# Patient Record
Sex: FEMALE | Employment: UNEMPLOYED | URBAN - METROPOLITAN AREA
[De-identification: names, ages, dates, MRNs, and addresses within clinical notes are randomized per-mention and may not be internally consistent; named-entity substitution may affect disease eponyms.]

---

## 2022-07-06 ENCOUNTER — OFFICE VISIT (OUTPATIENT)
Dept: FAMILY MEDICINE CLINIC | Facility: CLINIC | Age: 42
End: 2022-07-06

## 2022-07-06 VITALS
OXYGEN SATURATION: 98 % | SYSTOLIC BLOOD PRESSURE: 110 MMHG | WEIGHT: 225.4 LBS | HEART RATE: 86 BPM | BODY MASS INDEX: 39.94 KG/M2 | DIASTOLIC BLOOD PRESSURE: 78 MMHG | HEIGHT: 63 IN | TEMPERATURE: 98.5 F

## 2022-07-06 DIAGNOSIS — F32.2 SEVERE DEPRESSION (HCC): Primary | ICD-10-CM

## 2022-07-06 DIAGNOSIS — Z13.6 SCREENING FOR HEART DISEASE: ICD-10-CM

## 2022-07-06 DIAGNOSIS — F41.0 PANIC DISORDER: ICD-10-CM

## 2022-07-06 DIAGNOSIS — Z12.31 ENCOUNTER FOR SCREENING MAMMOGRAM FOR MALIGNANT NEOPLASM OF BREAST: ICD-10-CM

## 2022-07-06 DIAGNOSIS — F33.2 SEVERE EPISODE OF RECURRENT MAJOR DEPRESSIVE DISORDER, WITHOUT PSYCHOTIC FEATURES (HCC): ICD-10-CM

## 2022-07-06 DIAGNOSIS — F42.2 MIXED OBSESSIONAL THOUGHTS AND ACTS: ICD-10-CM

## 2022-07-06 PROCEDURE — 99214 OFFICE O/P EST MOD 30 MIN: CPT

## 2022-07-06 RX ORDER — FLUOXETINE HYDROCHLORIDE 20 MG/1
20 CAPSULE ORAL DAILY
Qty: 30 CAPSULE | Refills: 3 | Status: SHIPPED | OUTPATIENT
Start: 2022-07-06 | End: 2022-07-07 | Stop reason: SDUPTHER

## 2022-07-06 NOTE — ASSESSMENT & PLAN NOTE
Start Prozac  It can take 4-6 weeks for this medication to be fully therapeutic  Call with any affects

## 2022-07-06 NOTE — PROGRESS NOTES
Assessment/Plan:         Problem List Items Addressed This Visit        Other    Panic disorder     Start taking melatonin at night to get better sleep  Start Prozac once daily in the am  It can take 4-6 weeks for this medication to be fully therapeutic  Call with any affects  Relevant Medications    FLUoxetine (PROzac) 20 mg capsule    Other Relevant Orders    TSH, 3rd generation with Free T4 reflex    Mixed obsessional thoughts and acts     Start Prozac, please see a therapist  It can take 4-6 weeks for this medication to be fully therapeutic  Call with any affects  Severe depression ([facility] [address] ) - Primary     Start Prozac  It can take 4-6 weeks for this medication to be fully therapeutic  Call with any affects  Relevant Medications    FLUoxetine (PROzac) 20 mg capsule    Other Relevant Orders    TSH, 3rd generation with Free T4 reflex    Ambulatory Referral to Behavioral Health      Other Visit Diagnoses     Encounter for screening mammogram for malignant neoplasm of breast        Have mammogram as soon as possible  Relevant Orders    Mammo screening bilateral w 3d & cad    Screening for heart disease        Have lab work, will call with results  Relevant Orders    CBC and differential    Lipid panel    TSH, 3rd generation with Free T4 reflex    Comprehensive metabolic panel    Severe episode of recurrent major depressive disorder, without psychotic features (HCC)        Relevant Medications    FLUoxetine (PROzac) 20 mg capsule    BMI 39 0-39 9,adult                Subjective:      Patient ID: Kristi Alan is a 37 y o  female  Vee Ferrari is here today to establish  She has not had healthcare for 2 years because of not having insurance  She saw [facility] in the past  Her sister was diagnosed with breast cancer at 36, Vee Ferrari had a mammo at 46 and it was normal  She has been suffering with severe depression since her daughter was born 7 years ago         The following portions of the patient's history were reviewed and updated as appropriate:   Past Medical History:  She has a past medical history of Mild persistent asthma without complication  ,  _______________________________________________________________________  Medical Problems:  does not have any pertinent problems on file ,  _______________________________________________________________________  Past Surgical History:   has a past surgical history that includes Hysterectomy (Bilateral, 04/2018)  ,  _______________________________________________________________________  Family History:  family history includes Breast cancer in her maternal aunt and sister; Hypertension in her mother ,  _______________________________________________________________________  Social History:   reports that she has never smoked  She has never used smokeless tobacco  She reports previous alcohol use  No history on file for drug use ,  _______________________________________________________________________  Allergies:  has No Known Allergies     _______________________________________________________________________  Current Outpatient Medications   Medication Sig Dispense Refill    FLUoxetine (PROzac) 20 mg capsule Take 1 capsule (20 mg total) by mouth daily 30 capsule 3     No current facility-administered medications for this visit      _______________________________________________________________________  Review of Systems   Constitutional: Positive for fatigue  Negative for chills, diaphoresis and fever  HENT: Negative for congestion, ear pain, sinus pressure, sinus pain and sore throat  Eyes: Negative for pain and visual disturbance  Respiratory: Negative for cough, shortness of breath and wheezing  Cardiovascular: Negative for chest pain and palpitations  Gastrointestinal: Negative for abdominal pain, diarrhea, nausea and vomiting  Genitourinary: Negative for dysuria, frequency, hematuria and urgency     Musculoskeletal: Negative for arthralgias, back pain and myalgias  Skin: Negative for color change and rash  Neurological: Negative for dizziness, seizures, syncope, light-headedness and headaches  Psychiatric/Behavioral: Positive for agitation (Has panic attacks at night), dysphoric mood and sleep disturbance (sleeps 4-5 hours a night)  Negative for suicidal ideas  All other systems reviewed and are negative  Objective:  Vitals:    07/06/22 1603   BP: 110/78   BP Location: Left arm   Patient Position: Sitting   Cuff Size: Standard   Pulse: 86   Temp: 98 5 °F (36 9 °C)   TempSrc: Tympanic   SpO2: 98%   Weight: 102 kg (225 lb 6 4 oz)   Height: 5' 3" (1 6 m)     Body mass index is 39 93 kg/m²  Physical Exam  Vitals and nursing note reviewed  Constitutional:       Appearance: Normal appearance  She is not ill-appearing  HENT:      Head: Normocephalic  Right Ear: Tympanic membrane, ear canal and external ear normal  There is no impacted cerumen  Left Ear: Tympanic membrane, ear canal and external ear normal  There is no impacted cerumen  Nose: Nose normal  No congestion  Mouth/Throat:      Mouth: Mucous membranes are moist       Pharynx: No posterior oropharyngeal erythema  Eyes:      Extraocular Movements: Extraocular movements intact  Cardiovascular:      Rate and Rhythm: Normal rate and regular rhythm  Heart sounds: Normal heart sounds  No murmur heard  Pulmonary:      Effort: Pulmonary effort is normal       Breath sounds: Normal breath sounds  No wheezing  Abdominal:      Palpations: Abdomen is soft  Tenderness: There is no abdominal tenderness  Musculoskeletal:         General: Normal range of motion  Cervical back: Normal range of motion  Right lower leg: No edema  Left lower leg: No edema  Skin:     General: Skin is warm and dry  Neurological:      General: No focal deficit present  Mental Status: She is alert     Psychiatric:         Mood and Affect: Mood normal          Behavior: Behavior normal          Depression Screening Follow-up Plan: Patient's depression screening was positive with a PHQ-2 score of 6  Their PHQ-9 score was 25  Patient assessed for underlying major depression  They have no active suicidal ideations  Brief counseling provided and recommend additional follow-up/re-evaluation next office visit  BMI Counseling: Body mass index is 39 93 kg/m²  The BMI is above normal  Nutrition recommendations include 3-5 servings of fruits/vegetables daily and reducing intake of cholesterol  Exercise recommendations include moderate aerobic physical activity for 150 minutes/week and exercising 3-5 times per week

## 2022-07-06 NOTE — ASSESSMENT & PLAN NOTE
Start taking melatonin at night to get better sleep  Start Prozac once daily in the am  It can take 4-6 weeks for this medication to be fully therapeutic  Call with any affects

## 2022-07-06 NOTE — ASSESSMENT & PLAN NOTE
Start Prozac, please see a therapist  It can take 4-6 weeks for this medication to be fully therapeutic  Call with any affects

## 2022-07-06 NOTE — PATIENT INSTRUCTIONS
Problem List Items Addressed This Visit          Other    Panic disorder     Start taking melatonin at night to get better sleep  Start Prozac once daily in the am  It can take 4-6 weeks for this medication to be fully therapeutic  Call with any affects  Relevant Medications    FLUoxetine (PROzac) 20 mg capsule    Other Relevant Orders    TSH, 3rd generation with Free T4 reflex    Mixed obsessional thoughts and acts     Start Prozac, please see a therapist  It can take 4-6 weeks for this medication to be fully therapeutic  Call with any affects  Severe depression ([facility] [address] ) - Primary     Start Prozac  It can take 4-6 weeks for this medication to be fully therapeutic  Call with any affects  Relevant Medications    FLUoxetine (PROzac) 20 mg capsule    Other Relevant Orders    TSH, 3rd generation with Free T4 reflex    Ambulatory Referral to Behavioral Health          Other Visit Diagnoses       Encounter for screening mammogram for malignant neoplasm of breast        Have mammogram as soon as possible  Relevant Orders    Mammo screening bilateral w 3d & cad    Screening for heart disease        Have lab work, will call with results  Relevant Orders    CBC and differential    Lipid panel    TSH, 3rd generation with Free T4 reflex    Comprehensive metabolic panel            Depression   AMBULATORY CARE:   Depression  is a medical condition that causes feelings of sadness or hopelessness that do not go away  Depression may cause you to lose interest in things you used to enjoy  These feelings may interfere with your daily life    Common symptoms include the following:   · Appetite changes, or weight gain or loss    · Trouble going to sleep or staying asleep, or sleeping too much    · Fatigue or lack of energy    · Feeling restless, irritable, or withdrawn    · Feeling worthless, hopeless, discouraged, or guilty    · Trouble concentrating, remembering things, doing daily tasks, or making decisions    · Thoughts about hurting or killing yourself    Call your local emergency number (911 in the [address]) if:   · You think about harming yourself or someone else  · You have done something on purpose to hurt yourself  Call your therapist or doctor if:   · Your symptoms do not improve  · You cannot make it to your next appointment  · You have new symptoms  · You have questions or concerns about your condition or care  The following resources are available at any time to help you, if needed:   · [address] [place] [address]: 8-426.890.1968 (1-829-899-UMEA)     · Suicide Hotline: 4-703.732.1149 (4-462-NYZCCKF)     · For a list of international numbers: https://save org/find-help/international-resources/    Treatment for depression  may include medicine to relieve depression  Medicine is often used together with therapy  Therapy is a way for you to talk about your feelings and anything that may be causing depression  Therapy can be done alone or in a group  It may also be done with family members or a significant other  Self-care:   · Get regular physical activity  Try to be active for 30 minutes, 3 to 5 days a week  Physical activity can help relieve depression  Work with your healthcare provider to develop a plan that you enjoy  It may help to ask someone to be active with you  · Create a regular sleep schedule  A routine can help you relax before bed  Listen to music, read, or do yoga  Try to go to bed and wake up at the same time every day  Sleep is important for emotional health  · Eat a variety of healthy foods  Healthy foods include fruits, vegetables, whole-grain breads, low-fat dairy products, lean meats, fish, and cooked beans  A healthy meal plan is low in fat, salt, and added sugar  · Do not drink alcohol or use drugs  Alcohol and drugs can make depression worse  Talk to your therapist or doctor if you need help quitting      Follow up with your healthcare provider as directed: Your healthcare provider will monitor your progress at follow-up visits  He or she will also monitor your medicine if you take antidepressants  Your healthcare provider will ask if the medicine is helping  Tell him or her about any side effects or problems you may have with your medicine  The type or amount of medicine may need to be changed  Write down your questions so you remember to ask them during your visits  © Copyright [employer] 2022 Information is for End User's use only and may not be sold, redistributed or otherwise used for commercial purposes  All illustrations and images included in CareNotes® are the copyrighted property of [employer] A M [employer] Inc  or [facility] [employer]   The above information is an  only  It is not intended as medical advice for individual conditions or treatments  Talk to your doctor, nurse or pharmacist before following any medical regimen to see if it is safe and effective for you  Heart Healthy Diet   AMBULATORY CARE:   A heart healthy diet  is an eating plan low in unhealthy fats and sodium (salt)  The plan is high in healthy fats and fiber  A heart healthy diet helps improve your cholesterol levels and lowers your risk for heart disease and stroke  A dietitian will teach you how to read and understand food labels  Heart healthy diet guidelines to follow:   · Choose foods that contain healthy fats  ? Unsaturated fats  include monounsaturated and polyunsaturated fats  Unsaturated fat is found in foods such as soybean, canola, olive, corn, and safflower oils  It is also found in soft tub margarine that is made with liquid vegetable oil  ? Omega-3 fat  is found in certain fish, such as salmon, tuna, and trout, and in walnuts and flaxseed  Eat fish high in omega-3 fats at least 2 times a week  · Get 20 to 30 grams of fiber each day  Fruits, vegetables, whole-grain foods, and legumes (cooked beans) are good sources of fiber  · Limit or do not have unhealthy fats  ? Cholesterol  is found in animal foods, such as eggs and lobster, and in dairy products made from whole milk  Limit cholesterol to less than 200 mg each day  ? Saturated fat  is found in meats, such as lopez and hamburger  It is also found in chicken or turkey skin, whole milk, and butter  Limit saturated fat to less than 7% of your total daily calories  ? Trans fat  is found in packaged foods, such as potato chips and cookies  It is also in hard margarine, some fried foods, and shortening  Do not eat foods that contain trans fats  · Limit sodium as directed  You may be told to limit sodium to 2,000 to 2,300 mg each day  Choose low-sodium or no-salt-added foods  Add little or no salt to food you prepare  Use herbs and spices in place of salt  Include the following in your heart healthy plan:  Ask your dietitian or healthcare provider how many servings to have from each of the following food groups:  · Grains:      ? Whole-wheat breads, cereals, and pastas, and brown rice    ? Low-fat, low-sodium crackers and chips    · Vegetables:      ? Broccoli, green beans, green peas, and spinach    ? Collards, kale, and lima beans    ? Carrots, sweet potatoes, tomatoes, and peppers    ? Canned vegetables with no salt added    · Fruits:      ? Bananas, peaches, pears, and pineapple    ? Grapes, raisins, and dates    ? Oranges, tangerines, grapefruit, orange juice, and grapefruit juice    ? Apricots, mangoes, melons, and papaya    ? Raspberries and strawberries    ? Canned fruit with no added sugar    · Low-fat dairy:      ? Nonfat (skim) milk, 1% milk, and low-fat almond, cashew, or soy milks fortified with calcium    ? Low-fat cheese, regular or frozen yogurt, and cottage cheese    · Meats and proteins:      ? Lean cuts of beef and pork (loin, leg, round), skinless chicken and turkey    ?  Legumes, soy products, egg whites, or nuts    Limit or do not include the following in your heart healthy plan:   · Unhealthy fats and oils:      ? Whole or 2% milk, cream cheese, sour cream, or cheese    ? High-fat cuts of beef (T-bone steaks, ribs), chicken or turkey with skin, and organ meats such as liver    ? Butter, stick margarine, shortening, and cooking oils such as coconut or palm oil    · Foods and liquids high in sodium:      ? Packaged foods, such as frozen dinners, cookies, macaroni and cheese, and cereals with more than 300 mg of sodium per serving    ? Vegetables with added sodium, such as instant potatoes, vegetables with added sauces, or regular canned vegetables    ? Cured or smoked meats, such as hot dogs, lopez, and sausage    ? High-sodium ketchup, barbecue sauce, salad dressing, pickles, olives, soy sauce, or miso    · Foods and liquids high in sugar:      ? Candy, cake, cookies, pies, or doughnuts    ? Soft drinks (soda), sports drinks, or sweetened tea    ? Canned or dry mixes for cakes, soups, sauces, or gravies    Other healthy heart guidelines:   · Do not smoke  Nicotine and other chemicals in cigarettes and cigars can cause lung and heart damage  Ask your healthcare provider for information if you currently smoke and need help to quit  E-cigarettes or smokeless tobacco still contain nicotine  Talk to your healthcare provider before you use these products  · Limit or do not drink alcohol as directed  Alcohol can damage your heart and raise your blood pressure  Your healthcare provider may give you specific daily and weekly limits  The general recommended limit is 1 drink a day for women 21 or older and for men 72 or older  Do not have more than 3 drinks in a day or 7 in a week  The recommended limit is 2 drinks a day for men 24to 59years of age  Do not have more than 4 drinks in a day or 14 in a week  A drink of alcohol is 12 ounces of beer, 5 ounces of wine, or 1½ ounces of liquor  · Exercise regularly    Exercise can help you maintain a healthy weight and improve your blood pressure and cholesterol levels  Regular exercise can also decrease your risk for heart problems  Ask your healthcare provider about the best exercise plan for you  Do not start an exercise program without asking your healthcare provider  Follow up with your doctor or cardiologist as directed:  Write down your questions so you remember to ask them during your visits  © Copyright [address] 2022 Information is for End User's use only and may not be sold, redistributed or otherwise used for commercial purposes  All illustrations and images included in CareNotes® are the copyrighted property of [employer]  or [facility] [employer]   The above information is an  only  It is not intended as medical advice for individual conditions or treatments  Talk to your doctor, nurse or pharmacist before following any medical regimen to see if it is safe and effective for you

## 2022-07-07 ENCOUNTER — TELEPHONE (OUTPATIENT)
Dept: FAMILY MEDICINE CLINIC | Facility: CLINIC | Age: 42
End: 2022-07-07

## 2022-07-07 DIAGNOSIS — F32.2 SEVERE DEPRESSION (HCC): ICD-10-CM

## 2022-07-07 RX ORDER — FLUOXETINE HYDROCHLORIDE 20 MG/1
20 CAPSULE ORAL DAILY
Qty: 30 CAPSULE | Refills: 3 | Status: SHIPPED | OUTPATIENT
Start: 2022-07-07 | End: 2022-11-10 | Stop reason: SDUPTHER

## 2022-07-07 NOTE — TELEPHONE ENCOUNTER
Sarbjit Pill called in stated [facility] never received script for prozac  Called [facility] to confirm and they do not have the script   Can you please cancel script and send new script in

## 2022-11-10 ENCOUNTER — TELEPHONE (OUTPATIENT)
Dept: FAMILY MEDICINE CLINIC | Facility: CLINIC | Age: 42
End: 2022-11-10

## 2022-11-10 DIAGNOSIS — F32.2 SEVERE DEPRESSION (HCC): ICD-10-CM

## 2022-11-10 RX ORDER — FLUOXETINE HYDROCHLORIDE 20 MG/1
20 CAPSULE ORAL DAILY
Qty: 30 CAPSULE | Refills: 3 | Status: SHIPPED | OUTPATIENT
Start: 2022-11-10 | End: 2023-04-18 | Stop reason: SDUPTHER

## 2022-11-10 NOTE — TELEPHONE ENCOUNTER
Pt called and left message on [employer]  Requested refill on her fluoxetine (prozac) 20 mg  Said she has no more refills left, needed more put in by you as pharmacy told her to talk with her provider for more refills

## 2023-09-06 DIAGNOSIS — F32.2 SEVERE DEPRESSION (HCC): ICD-10-CM

## 2023-09-06 RX ORDER — FLUOXETINE HYDROCHLORIDE 20 MG/1
20 CAPSULE ORAL DAILY
Qty: 30 CAPSULE | Refills: 0 | Status: SHIPPED | OUTPATIENT
Start: 2023-09-06

## 2023-10-30 DIAGNOSIS — F32.2 SEVERE DEPRESSION (HCC): ICD-10-CM

## 2023-10-30 RX ORDER — FLUOXETINE HYDROCHLORIDE 20 MG/1
20 CAPSULE ORAL DAILY
Qty: 30 CAPSULE | Refills: 0 | Status: SHIPPED | OUTPATIENT
Start: 2023-10-30

## 2023-10-30 NOTE — TELEPHONE ENCOUNTER
Patient made aware of refill - also made aware to schedule a follow up once she receives her insurance

## 2023-10-30 NOTE — TELEPHONE ENCOUNTER
Patient stated that she was told that she cannot get a refill until she comes in and gets evaluated by the physician because she has not been to the office in quite sometime. She stated that she is applying for state coverage but it has not come back as of yet.  Please call patient back and let her  know if the physician will refill her medication without a visit, last visit was 07/22

## 2023-11-25 DIAGNOSIS — F32.2 SEVERE DEPRESSION (HCC): ICD-10-CM

## 2023-11-26 ENCOUNTER — APPOINTMENT (EMERGENCY)
Dept: RADIOLOGY | Facility: HOSPITAL | Age: 43
End: 2023-11-26

## 2023-11-26 ENCOUNTER — HOSPITAL ENCOUNTER (EMERGENCY)
Facility: HOSPITAL | Age: 43
Discharge: HOME | End: 2023-11-26
Attending: EMERGENCY MEDICINE | Admitting: EMERGENCY MEDICINE

## 2023-11-26 VITALS
OXYGEN SATURATION: 96 % | DIASTOLIC BLOOD PRESSURE: 56 MMHG | HEART RATE: 81 BPM | SYSTOLIC BLOOD PRESSURE: 98 MMHG | RESPIRATION RATE: 20 BRPM | TEMPERATURE: 97.8 F

## 2023-11-26 DIAGNOSIS — J45.901 ACUTE ASTHMA EXACERBATION: Primary | ICD-10-CM

## 2023-11-26 LAB
ALBUMIN SERPL BCP-MCNC: 4.3 G/DL (ref 3.5–5)
ALP SERPL-CCNC: 67 U/L (ref 34–104)
ALT SERPL W P-5'-P-CCNC: 14 U/L (ref 7–52)
ANION GAP SERPL CALCULATED.3IONS-SCNC: 5 MMOL/L
AST SERPL W P-5'-P-CCNC: 13 U/L (ref 13–39)
ATRIAL RATE: 84 BPM
BASOPHILS # BLD AUTO: 0.04 THOUSANDS/ÂΜL (ref 0–0.1)
BASOPHILS NFR BLD AUTO: 1 % (ref 0–1)
BILIRUB SERPL-MCNC: 0.46 MG/DL (ref 0.2–1)
BUN SERPL-MCNC: 8 MG/DL (ref 5–25)
CALCIUM SERPL-MCNC: 9.1 MG/DL (ref 8.4–10.2)
CARDIAC TROPONIN I PNL SERPL HS: <2 NG/L
CHLORIDE SERPL-SCNC: 105 MMOL/L (ref 96–108)
CO2 SERPL-SCNC: 25 MMOL/L (ref 21–32)
CREAT SERPL-MCNC: 0.79 MG/DL (ref 0.6–1.3)
D DIMER PPP FEU-MCNC: 0.32 UG/ML FEU
EOSINOPHIL # BLD AUTO: 0.18 THOUSAND/ÂΜL (ref 0–0.61)
EOSINOPHIL NFR BLD AUTO: 3 % (ref 0–6)
ERYTHROCYTE [DISTWIDTH] IN BLOOD BY AUTOMATED COUNT: 13 % (ref 11.6–15.1)
FLUAV RNA RESP QL NAA+PROBE: NEGATIVE
FLUBV RNA RESP QL NAA+PROBE: NEGATIVE
GFR SERPL CREATININE-BSD FRML MDRD: 92 ML/MIN/1.73SQ M
GLUCOSE SERPL-MCNC: 111 MG/DL (ref 65–140)
HCT VFR BLD AUTO: 41.7 % (ref 34.8–46.1)
HGB BLD-MCNC: 13.7 G/DL (ref 11.5–15.4)
IMM GRANULOCYTES # BLD AUTO: 0.01 THOUSAND/UL (ref 0–0.2)
IMM GRANULOCYTES NFR BLD AUTO: 0 % (ref 0–2)
LYMPHOCYTES # BLD AUTO: 1.67 THOUSANDS/ÂΜL (ref 0.6–4.47)
LYMPHOCYTES NFR BLD AUTO: 30 % (ref 14–44)
MCH RBC QN AUTO: 30 PG (ref 26.8–34.3)
MCHC RBC AUTO-ENTMCNC: 32.9 G/DL (ref 31.4–37.4)
MCV RBC AUTO: 91 FL (ref 82–98)
MONOCYTES # BLD AUTO: 0.37 THOUSAND/ÂΜL (ref 0.17–1.22)
MONOCYTES NFR BLD AUTO: 7 % (ref 4–12)
NEUTROPHILS # BLD AUTO: 3.27 THOUSANDS/ÂΜL (ref 1.85–7.62)
NEUTS SEG NFR BLD AUTO: 59 % (ref 43–75)
NRBC BLD AUTO-RTO: 0 /100 WBCS
P AXIS: 57 DEGREES
PLATELET # BLD AUTO: 328 THOUSANDS/UL (ref 149–390)
PMV BLD AUTO: 8.9 FL (ref 8.9–12.7)
POTASSIUM SERPL-SCNC: 3.9 MMOL/L (ref 3.5–5.3)
PR INTERVAL: 168 MS
PROT SERPL-MCNC: 7.5 G/DL (ref 6.4–8.4)
QRS AXIS: 63 DEGREES
QRSD INTERVAL: 88 MS
QT INTERVAL: 358 MS
QTC INTERVAL: 423 MS
RBC # BLD AUTO: 4.56 MILLION/UL (ref 3.81–5.12)
RSV RNA RESP QL NAA+PROBE: NEGATIVE
SARS-COV-2 RNA RESP QL NAA+PROBE: NEGATIVE
SODIUM SERPL-SCNC: 135 MMOL/L (ref 135–147)
T WAVE AXIS: 60 DEGREES
VENTRICULAR RATE: 84 BPM
WBC # BLD AUTO: 5.54 THOUSAND/UL (ref 4.31–10.16)

## 2023-11-26 PROCEDURE — 93005 ELECTROCARDIOGRAM TRACING: CPT

## 2023-11-26 PROCEDURE — 99284 EMERGENCY DEPT VISIT MOD MDM: CPT

## 2023-11-26 PROCEDURE — 80053 COMPREHEN METABOLIC PANEL: CPT | Performed by: PHYSICIAN ASSISTANT

## 2023-11-26 PROCEDURE — 85025 COMPLETE CBC W/AUTO DIFF WBC: CPT | Performed by: PHYSICIAN ASSISTANT

## 2023-11-26 PROCEDURE — 36415 COLL VENOUS BLD VENIPUNCTURE: CPT | Performed by: PHYSICIAN ASSISTANT

## 2023-11-26 PROCEDURE — 84484 ASSAY OF TROPONIN QUANT: CPT | Performed by: PHYSICIAN ASSISTANT

## 2023-11-26 PROCEDURE — 94640 AIRWAY INHALATION TREATMENT: CPT

## 2023-11-26 PROCEDURE — 71046 X-RAY EXAM CHEST 2 VIEWS: CPT

## 2023-11-26 PROCEDURE — 99285 EMERGENCY DEPT VISIT HI MDM: CPT | Performed by: PHYSICIAN ASSISTANT

## 2023-11-26 PROCEDURE — 85379 FIBRIN DEGRADATION QUANT: CPT | Performed by: PHYSICIAN ASSISTANT

## 2023-11-26 PROCEDURE — 0241U HB NFCT DS VIR RESP RNA 4 TRGT: CPT | Performed by: PHYSICIAN ASSISTANT

## 2023-11-26 RX ORDER — ALBUTEROL SULFATE 2.5 MG/3ML
5 SOLUTION RESPIRATORY (INHALATION) ONCE
Status: COMPLETED | OUTPATIENT
Start: 2023-11-26 | End: 2023-11-26

## 2023-11-26 RX ORDER — PREDNISONE 50 MG/1
50 TABLET ORAL DAILY
Qty: 4 TABLET | Refills: 0 | Status: SHIPPED | OUTPATIENT
Start: 2023-11-27 | End: 2023-12-01

## 2023-11-26 RX ORDER — ALBUTEROL SULFATE 2.5 MG/3ML
2.5 SOLUTION RESPIRATORY (INHALATION) EVERY 6 HOURS PRN
Qty: 75 ML | Refills: 0 | Status: SHIPPED | OUTPATIENT
Start: 2023-11-26 | End: 2023-11-27 | Stop reason: SDUPTHER

## 2023-11-26 RX ADMIN — ALBUTEROL SULFATE 5 MG: 2.5 SOLUTION RESPIRATORY (INHALATION) at 08:58

## 2023-11-26 RX ADMIN — PREDNISONE 50 MG: 20 TABLET ORAL at 10:29

## 2023-11-26 RX ADMIN — IPRATROPIUM BROMIDE 0.5 MG: 0.5 SOLUTION RESPIRATORY (INHALATION) at 08:58

## 2023-11-26 NOTE — ED PROVIDER NOTES
History  Chief Complaint   Patient presents with    Asthma     Patient co "my asthma is acting up." Symptoms started on Friday. Patient reports she ran out of her nebulizer medication. Patient further reports hx of blood clots in her lungs and states "it kind of feels like when I had blood clots in my lungs. I have a weird feeling in my chest and lungs. "      38yo female with a history of asthma presenting for evaluation of shortness of breath x 2 days. She reports substernal chest tightness and dyspnea. She also has discomfort in her upper back. She reports some improvement when using her albuterol inhaler although she is unsure if asthma is the cause of her symptoms. She has been unable to use her nebulizer as she ran out of nebulizer solution. She has a prior history of PE in the postpartum period about 8 years ago and she is worried about this. She is no longer on anticoagulation. No fevers. History provided by:  Patient   used: No        None       Past Medical History:   Diagnosis Date    Mild persistent asthma without complication        Past Surgical History:   Procedure Laterality Date    HYSTERECTOMY Bilateral 04/2018       Family History   Problem Relation Age of Onset    Hypertension Mother     Breast cancer Sister     Breast cancer Maternal Aunt      I have reviewed and agree with the history as documented. E-Cigarette/Vaping     E-Cigarette/Vaping Substances     Social History     Tobacco Use    Smoking status: Never    Smokeless tobacco: Never   Substance Use Topics    Alcohol use: Not Currently       Review of Systems   Constitutional:  Negative for chills and fever. HENT:  Negative for drooling and voice change. Eyes:  Negative for discharge and redness. Respiratory:  Positive for chest tightness and shortness of breath. Negative for stridor. Cardiovascular:  Negative for chest pain and leg swelling. Gastrointestinal:  Negative for nausea and vomiting. Musculoskeletal:  Negative for neck pain and neck stiffness. Skin:  Negative for color change and rash. Neurological:  Negative for seizures and syncope. Psychiatric/Behavioral:  Negative for confusion. The patient is not nervous/anxious. All other systems reviewed and are negative. Physical Exam  Physical Exam  Vitals and nursing note reviewed. Constitutional:       General: She is not in acute distress. Appearance: She is well-developed. She is not diaphoretic. HENT:      Head: Normocephalic and atraumatic. Right Ear: External ear normal.      Left Ear: External ear normal.      Nose: Nose normal.   Eyes:      General: No scleral icterus. Right eye: No discharge. Left eye: No discharge. Conjunctiva/sclera: Conjunctivae normal.   Cardiovascular:      Rate and Rhythm: Normal rate and regular rhythm. Heart sounds: Normal heart sounds. No murmur heard. Pulmonary:      Effort: Pulmonary effort is normal. No respiratory distress. Breath sounds: No stridor. Decreased breath sounds present. No wheezing or rales. Musculoskeletal:         General: No deformity. Normal range of motion. Cervical back: Normal range of motion and neck supple. Lymphadenopathy:      Cervical: No cervical adenopathy. Skin:     General: Skin is warm and dry. Neurological:      Mental Status: She is alert. She is not disoriented. GCS: GCS eye subscore is 4. GCS verbal subscore is 5. GCS motor subscore is 6.    Psychiatric:         Behavior: Behavior normal.         Vital Signs  ED Triage Vitals [11/26/23 0826]   Temperature Pulse Respirations Blood Pressure SpO2   97.8 °F (36.6 °C) 81 18 133/83 99 %      Temp Source Heart Rate Source Patient Position - Orthostatic VS BP Location FiO2 (%)   Temporal Monitor Lying Right arm --      Pain Score       --           Vitals:    11/26/23 0826 11/26/23 0845 11/26/23 1030   BP: 133/83 121/77 98/56   Pulse: 81 89 81   Patient Position - Orthostatic VS: Lying  Lying         Visual Acuity  Visual Acuity      Flowsheet Row Most Recent Value   L Pupil Size (mm) 3   R Pupil Size (mm) 3            ED Medications  Medications   albuterol inhalation solution 5 mg (5 mg Nebulization Given 11/26/23 0858)   ipratropium (ATROVENT) 0.02 % inhalation solution 0.5 mg (0.5 mg Nebulization Given 11/26/23 0858)   predniSONE tablet 50 mg (50 mg Oral Given 11/26/23 1029)       Diagnostic Studies  Results Reviewed       Procedure Component Value Units Date/Time    D-Dimer [[id number]]  (Normal) Collected: 11/26/23 0907    Lab Status: Final result Specimen: Blood from Arm, Right Updated: 11/26/23 0950     D-Dimer, Quant 0.32 ug/ml FEU     FLU/RSV/COVID - if FLU/RSV clinically relevant [[id number]]  (Normal) Collected: 11/26/23 0857    Lab Status: Final result Specimen: Nares from Nose Updated: 11/26/23 0950     SARS-CoV-2 Negative     INFLUENZA A PCR Negative     INFLUENZA B PCR Negative     RSV PCR Negative    Narrative:      FOR PEDIATRIC PATIENTS - copy/paste COVID Guidelines URL to browser: [email or web address]    SARS-CoV-2 assay is a Nucleic Acid Amplification assay intended for the  qualitative detection of nucleic acid from SARS-CoV-2 in nasopharyngeal  swabs. Results are for the presumptive identification of SARS-CoV-2 RNA. Positive results are indicative of infection with SARS-CoV-2, the virus  causing COVID-19, but do not rule out bacterial infection or co-infection  with other viruses. Laboratories within the [facility] and its  territories are required to report all positive results to the appropriate  public health authorities. Negative results do not preclude SARS-CoV-2  infection and should not be used as the sole basis for treatment or other  patient management decisions. Negative results must be combined with  clinical observations, patient history, and epidemiological information.   This test has not been FDA cleared or approved. This test has been authorized by FDA under an Emergency Use Authorization  (EUA). This test is only authorized for the duration of time the  declaration that circumstances exist justifying the authorization of the  emergency use of an in vitro diagnostic tests for detection of SARS-CoV-2  virus and/or diagnosis of COVID-19 infection under section 564(b)(1) of  the Act, 21 U. S.C. 581RVD-5(P)(4), unless the authorization is terminated  or revoked sooner. The test has been validated but independent review by FDA  and CLIA is pending. Test performed using [employer] [facility]: This RT-PCR assay targets N2,  a region unique to SARS-CoV-2. A conserved region in the E-gene was chosen  for pan-Sarbecovirus detection which includes SARS-CoV-2. According to CMS-2020-01-R, this platform meets the definition of high-throughput technology.     HS Troponin 0hr (reflex protocol) [[id number]]  (Normal) Collected: 11/26/23 0907    Lab Status: Final result Specimen: Blood from Arm, Right Updated: 11/26/23 0939     hs TnI 0hr <2 ng/L     Comprehensive metabolic panel [[id number]] Collected: 11/26/23 0907    Lab Status: Final result Specimen: Blood from Arm, Right Updated: 11/26/23 0934     Sodium 135 mmol/L      Potassium 3.9 mmol/L      Chloride 105 mmol/L      CO2 25 mmol/L      ANION GAP 5 mmol/L      BUN 8 mg/dL      Creatinine 0.79 mg/dL      Glucose 111 mg/dL      Calcium 9.1 mg/dL      AST 13 U/L      ALT 14 U/L      Alkaline Phosphatase 67 U/L      Total Protein 7.5 g/dL      Albumin 4.3 g/dL      Total Bilirubin 0.46 mg/dL      eGFR 92 ml/min/1.73sq m     Narrative:      [place] guidelines for Chronic Kidney Disease (CKD):     Stage 1 with normal or high GFR (GFR > 90 mL/min/1.73 square meters)    Stage 2 Mild CKD (GFR = 60-89 mL/min/1.73 square meters)    Stage 3A Moderate CKD (GFR = 45-59 mL/min/1.73 square meters)    Stage 3B Moderate CKD (GFR = 30-44 mL/min/1.73 square meters)    Stage 4 Severe CKD (GFR = 15-29 mL/min/1.73 square meters)    Stage 5 End Stage CKD (GFR <15 mL/min/1.73 square meters)  Note: GFR calculation is accurate only with a steady state creatinine    CBC and differential [[id number]] Collected: 11/26/23 0907    Lab Status: Final result Specimen: Blood from Arm, Right Updated: 11/26/23 0914     WBC 5.54 Thousand/uL      RBC 4.56 Million/uL      Hemoglobin 13.7 g/dL      Hematocrit 41.7 %      MCV 91 fL      MCH 30.0 pg      MCHC 32.9 g/dL      RDW 13.0 %      MPV 8.9 fL      Platelets 178 Thousands/uL      nRBC 0 /100 WBCs      Neutrophils Relative 59 %      Immat GRANS % 0 %      Lymphocytes Relative 30 %      Monocytes Relative 7 %      Eosinophils Relative 3 %      Basophils Relative 1 %      Neutrophils Absolute 3.27 Thousands/µL      Immature Grans Absolute 0.01 Thousand/uL      Lymphocytes Absolute 1.67 Thousands/µL      Monocytes Absolute 0.37 Thousand/µL      Eosinophils Absolute 0.18 Thousand/µL      Basophils Absolute 0.04 Thousands/µL                    XR chest 2 views   ED Interpretation by Geraldine Barajas PA-C (11/26 0935)   No acute abnormality      Final Result by Ray Frias MD (11/26 1118)      No acute consolidation or congestion               Workstation performed: [id number]                    Procedures  ECG 12 Lead Documentation Only    Date/Time: 11/26/2023 3:53 PM    Performed by: Geraldine Barajas PA-C  Authorized by: Geraldine Barajas PA-C    Indications / Diagnosis:  SOB  ECG reviewed by me, the ED Provider: yes    Patient location:  ED  Rate:     ECG rate:  84    ECG rate assessment: normal    Rhythm:     Rhythm: sinus rhythm    Ectopy:     Ectopy: none    QRS:     QRS axis:  Normal  Conduction:     Conduction: normal    ST segments:     ST segments:  Normal  T waves:     T waves: normal             ED Course  ED Course as of 11/27/23 1126   Sun Nov 26, 2023   0951 D-Dimer, Quant: 0.32 SBIRT 20yo+      Flowsheet Row Most Recent Value   Initial Alcohol Screen: US AUDIT-C     1. How often do you have a drink containing alcohol? 0 Filed at: 11/26/2023 0840   2. How many drinks containing alcohol do you have on a typical day you are drinking? 0 Filed at: 11/26/2023 0840   3b. FEMALE Any Age, or MALE 65+: How often do you have 4 or more drinks on one occassion? 0 Filed at: 11/26/2023 0840   Audit-C Score 0 Filed at: 11/26/2023 0840   YANNI: How many times in the past year have you. .. Used an illegal drug or used a prescription medication for non-medical reasons? Never Filed at: 11/26/2023 0840                      Medical Decision Making  37yoF presenting for shortness of breath x 2 days. Also having chest tightness. Hx of asthma. She is also worried about a PE. She is afebrile and hemodynamically stable. Oxygen saturation 99% on room air. Mildly decreased air movement on lung exam without wheezing. Respirations non-labored. Initial ED plan: Check cardiac labs, D-dimer, COVID/flu swab, EKG, and CXR. DuoNeb and reassess. Final assessment: Labs unremarkable including normal white count. D-dimer normal making PE very unlikely. No ischemic changes on EKG and troponin is <2. CXR is clear. Patient feeling significantly improved after neb treatment and air movement improved. She is stable for discharge. She was started on a course prednisone. Refill given for albuterol solution. Advised close PCP follow-up. ED return precautions discussed. Patient expressed understanding and is agreeable to plan. Patient discharged in stable condition. Problems Addressed:  Acute asthma exacerbation: acute illness or injury    Amount and/or Complexity of Data Reviewed  Labs: ordered. Decision-making details documented in ED Course. Radiology: ordered and independent interpretation performed. ECG/medicine tests: ordered and independent interpretation performed.     Risk  Prescription drug management. Disposition  Final diagnoses:   Acute asthma exacerbation     Time reflects when diagnosis was documented in both MDM as applicable and the Disposition within this note       Time User Action Codes Description Comment    11/26/2023 10:03 AM [address], [address] [J45.901] Acute asthma exacerbation           ED Disposition       ED Disposition   Discharge    Condition   Stable    Date/Time   Sun Nov 26, 2023 [address] discharge to home/self care. Follow-up Information       Follow up With Specialties Details Why Contact Info Additional EDWARD Umaña Nurse Practitioner Schedule an appointment as soon as possible for a visit   [address] [address] [place] [id number]  [facility] Emergency Medicine  If symptoms worsen [address] [facility], [place], [place], [address]            Discharge Medication List as of 11/26/2023 10:04 AM        START taking these medications    Details   predniSONE 50 mg tablet Take 1 tablet (50 mg total) by mouth daily for 4 days Do not start before November 27, 2023., Starting Mon 11/27/2023, Until Fri 12/1/2023, Normal      albuterol (2.5 mg/3 mL) 0.083 % nebulizer solution Take 3 mL (2.5 mg total) by nebulization every 6 (six) hours as needed for wheezing or shortness of breath, Starting Sun 11/26/2023, Normal           CONTINUE these medications which have NOT CHANGED    Details   FLUoxetine (PROzac) 20 mg capsule Take 1 capsule (20 mg total) by mouth daily, Starting Mon 10/30/2023, Normal             No discharge procedures on file.     PDMP Review       None            ED Provider  Electronically Signed by             Stone Royal PA-C  11/27/23 2447

## 2023-11-26 NOTE — DISCHARGE INSTRUCTIONS
Take prednisone as prescribed. Use inhaler and nebulizer as needed. Please follow-up with your family doctor. Return to the ER with any worsening symptoms.

## 2023-11-27 DIAGNOSIS — F32.2 SEVERE DEPRESSION (HCC): ICD-10-CM

## 2023-11-27 DIAGNOSIS — J45.901 ACUTE ASTHMA EXACERBATION: ICD-10-CM

## 2023-11-27 RX ORDER — FLUOXETINE HYDROCHLORIDE 20 MG/1
20 CAPSULE ORAL DAILY
Qty: 30 CAPSULE | Refills: 0 | Status: SHIPPED | OUTPATIENT
Start: 2023-11-27

## 2023-11-27 RX ORDER — ALBUTEROL SULFATE 2.5 MG/3ML
2.5 SOLUTION RESPIRATORY (INHALATION) EVERY 6 HOURS PRN
Qty: 75 ML | Refills: 0 | Status: SHIPPED | OUTPATIENT
Start: 2023-11-27

## 2023-11-27 RX ORDER — FLUOXETINE HYDROCHLORIDE 20 MG/1
20 CAPSULE ORAL DAILY
Qty: 30 CAPSULE | Refills: 0 | Status: SHIPPED | OUTPATIENT
Start: 2023-11-27 | End: 2023-11-27 | Stop reason: SDUPTHER

## 2023-12-26 ENCOUNTER — OFFICE VISIT (OUTPATIENT)
Dept: FAMILY MEDICINE CLINIC | Facility: CLINIC | Age: 43
End: 2023-12-26

## 2023-12-26 VITALS
HEIGHT: 63 IN | HEART RATE: 81 BPM | DIASTOLIC BLOOD PRESSURE: 82 MMHG | TEMPERATURE: 98 F | SYSTOLIC BLOOD PRESSURE: 122 MMHG | WEIGHT: 222.8 LBS | BODY MASS INDEX: 39.48 KG/M2 | OXYGEN SATURATION: 99 %

## 2023-12-26 DIAGNOSIS — F32.2 SEVERE DEPRESSION (HCC): ICD-10-CM

## 2023-12-26 DIAGNOSIS — E55.9 VITAMIN D DEFICIENCY: ICD-10-CM

## 2023-12-26 DIAGNOSIS — F41.0 PANIC DISORDER: ICD-10-CM

## 2023-12-26 DIAGNOSIS — Z13.6 SCREENING FOR CARDIOVASCULAR CONDITION: ICD-10-CM

## 2023-12-26 DIAGNOSIS — G44.209 TENSION HEADACHE: ICD-10-CM

## 2023-12-26 DIAGNOSIS — F42.2 MIXED OBSESSIONAL THOUGHTS AND ACTS: ICD-10-CM

## 2023-12-26 DIAGNOSIS — Z12.31 SCREENING MAMMOGRAM, ENCOUNTER FOR: Primary | ICD-10-CM

## 2023-12-26 PROCEDURE — 96372 THER/PROPH/DIAG INJ SC/IM: CPT

## 2023-12-26 PROCEDURE — 99214 OFFICE O/P EST MOD 30 MIN: CPT

## 2023-12-26 RX ORDER — HYDROXYZINE HYDROCHLORIDE 10 MG/1
10 TABLET, FILM COATED ORAL EVERY 6 HOURS PRN
Qty: 90 TABLET | Refills: 0 | Status: SHIPPED | OUTPATIENT
Start: 2023-12-26

## 2023-12-26 RX ORDER — KETOROLAC TROMETHAMINE 30 MG/ML
30 INJECTION, SOLUTION INTRAMUSCULAR; INTRAVENOUS ONCE
Status: COMPLETED | OUTPATIENT
Start: 2023-12-26 | End: 2023-12-26

## 2023-12-26 RX ADMIN — KETOROLAC TROMETHAMINE 30 MG: 30 INJECTION, SOLUTION INTRAMUSCULAR; INTRAVENOUS at 14:37

## 2023-12-26 NOTE — PROGRESS NOTES
Assessment/Plan:         Problem List Items Addressed This Visit        Other    Panic disorder     Will add hydroxyzine as needed for anxiety and sleep, follow up in 2 weeks for physical. For now declines behavioral health. Recommend a support group for moms with children with autism. Continue current dose of prozac.          Relevant Medications    hydrOXYzine HCL (ATARAX) 10 mg tablet    Mixed obsessional thoughts and acts     Will add hydroxyzine as needed for anxiety and sleep, follow up in 2 weeks for physical. For now declines behavioral health. Recommend a support group for moms with children with autism. Continue current dose of prozac.          Relevant Medications    hydrOXYzine HCL (ATARAX) 10 mg tablet    Severe depression (HCC)     Will add hydroxyzine as needed for anxiety and sleep, follow up in 2 weeks for physical. For now declines behavioral health. Recommend a support group for moms with children with autism. Continue current dose of prozac.          Relevant Medications    hydrOXYzine HCL (ATARAX) 10 mg tablet    Tension headache     Will give torodol today. Magnesium 400 mg, Riboflavin (B2) 400 mg,COQ10. Stay well hydrated 2000ml/day H2O. Get 7-8 hr of sleep. Limit alcohol.           Relevant Medications    ketorolac (TORADOL) injection 30 mg (Completed)   Other Visit Diagnoses     Screening mammogram, encounter for    -  Primary    please have mammo, will call with results.    Relevant Orders    Mammo screening bilateral w 3d & cad    Screening for cardiovascular condition        Have fasting lab work, return in 2 weeks for physical    Relevant Orders    CBC and differential    Comprehensive metabolic panel    Lipid panel    Hemoglobin A1C    TSH, 3rd generation with Free T4 reflex    Vitamin D deficiency        Have fasting lab work, return in 2 weeks for physical    Relevant Orders    Vitamin D 25 hydroxy              Subjective:      Patient ID: Lory Raman is a 43 y.o. female.    Lory is  here for follow up, she is still having days where she has a hard time getting out of bed. She does think about suicide but no plan. Her son just got diagnosed with autism and she is not managing well.     Medication Refill  Associated symptoms include fatigue and headaches. Pertinent negatives include no abdominal pain, arthralgias, chest pain, chills, congestion, coughing, diaphoresis, fever, myalgias, rash, sore throat or vomiting.       The following portions of the patient's history were reviewed and updated as appropriate:   Past Medical History:  She has a past medical history of Mild persistent asthma without complication.,  _______________________________________________________________________  Medical Problems:  does not have any pertinent problems on file.,  _______________________________________________________________________  Past Surgical History:   has a past surgical history that includes Hysterectomy (Bilateral, 04/2018).,  _______________________________________________________________________  Family History:  family history includes Breast cancer in her maternal aunt and sister; Hypertension in her mother.,  _______________________________________________________________________  Social History:   reports that she has never smoked. She has never used smokeless tobacco. She reports that she does not currently use alcohol. No history on file for drug use.,  _______________________________________________________________________  Allergies:  has No Known Allergies..  _______________________________________________________________________  Current Outpatient Medications   Medication Sig Dispense Refill   • albuterol (2.5 mg/3 mL) 0.083 % nebulizer solution Take 3 mL (2.5 mg total) by nebulization every 6 (six) hours as needed for wheezing or shortness of breath 75 mL 0   • FLUoxetine (PROzac) 20 mg capsule Take 1 capsule (20 mg total) by mouth daily 30 capsule 0   • hydrOXYzine HCL (ATARAX) 10 mg  "tablet Take 1 tablet (10 mg total) by mouth every 6 (six) hours as needed for anxiety 90 tablet 0     No current facility-administered medications for this visit.     _______________________________________________________________________  Review of Systems   Constitutional:  Positive for fatigue. Negative for chills, diaphoresis and fever.   HENT:  Negative for congestion, ear pain, rhinorrhea, sinus pressure, sinus pain and sore throat.    Eyes:  Negative for pain and visual disturbance.   Respiratory:  Negative for cough, chest tightness, shortness of breath and wheezing.    Cardiovascular:  Negative for chest pain and palpitations.   Gastrointestinal:  Negative for abdominal pain, constipation, diarrhea and vomiting.   Genitourinary:  Negative for dysuria, frequency and hematuria.   Musculoskeletal:  Negative for arthralgias, back pain and myalgias.   Skin:  Negative for color change and rash.   Neurological:  Positive for headaches. Negative for seizures and syncope.   Psychiatric/Behavioral:  Positive for dysphoric mood and sleep disturbance. The patient is nervous/anxious.    All other systems reviewed and are negative.        Objective:  Vitals:    12/26/23 1350   BP: 122/82   BP Location: Left arm   Patient Position: Sitting   Cuff Size: Standard   Pulse: 81   Temp: 98 °F (36.7 °C)   TempSrc: Tympanic   SpO2: 99%   Weight: 101 kg (222 lb 12.8 oz)   Height: 5' 3\" (1.6 m)     Body mass index is 39.47 kg/m².     Physical Exam  Vitals and nursing note reviewed.   Constitutional:       General: She is not in acute distress.     Appearance: Normal appearance. She is not ill-appearing.   HENT:      Head: Normocephalic.      Right Ear: Tympanic membrane, ear canal and external ear normal. There is no impacted cerumen.      Left Ear: Tympanic membrane, ear canal and external ear normal. There is no impacted cerumen.      Nose: Nose normal. No congestion.      Mouth/Throat:      Mouth: Mucous membranes are moist.     "  Pharynx: Oropharynx is clear. No posterior oropharyngeal erythema.   Eyes:      Extraocular Movements: Extraocular movements intact.      Conjunctiva/sclera: Conjunctivae normal.      Pupils: Pupils are equal, round, and reactive to light.   Cardiovascular:      Rate and Rhythm: Normal rate and regular rhythm.      Pulses: Normal pulses.      Heart sounds: Normal heart sounds.   Pulmonary:      Effort: Pulmonary effort is normal. No respiratory distress.      Breath sounds: Normal breath sounds. No wheezing.   Abdominal:      General: Bowel sounds are normal.      Palpations: Abdomen is soft.   Musculoskeletal:         General: No swelling or tenderness. Normal range of motion.      Cervical back: Normal range of motion. No tenderness.      Right lower leg: No edema.      Left lower leg: No edema.   Lymphadenopathy:      Cervical: No cervical adenopathy.   Skin:     General: Skin is warm and dry.   Neurological:      General: No focal deficit present.      Mental Status: She is alert and oriented to person, place, and time.   Psychiatric:         Mood and Affect: Mood normal.         Behavior: Behavior normal.         Thought Content: Thought content normal.         Judgment: Judgment normal.

## 2023-12-26 NOTE — ASSESSMENT & PLAN NOTE
Will add hydroxyzine as needed for anxiety and sleep, follow up in 2 weeks for physical. For now declines behavioral health. Recommend a support group for moms with children with autism. Continue current dose of prozac.

## 2023-12-26 NOTE — ASSESSMENT & PLAN NOTE
Will give torodol today. Magnesium 400 mg, Riboflavin (B2) 400 mg,COQ10. Stay well hydrated 2000ml/day H2O. Get 7-8 hr of sleep. Limit alcohol.

## 2023-12-26 NOTE — PATIENT INSTRUCTIONS
Problem List Items Addressed This Visit          Other    Panic disorder     Will add hydroxyzine as needed for anxiety and sleep, follow up in 2 weeks for physical. For now declines behavioral health. Recommend a support group for moms with children with autism. Continue current dose of prozac.          Relevant Medications    hydrOXYzine HCL (ATARAX) 10 mg tablet    Mixed obsessional thoughts and acts     Will add hydroxyzine as needed for anxiety and sleep, follow up in 2 weeks for physical. For now declines behavioral health. Recommend a support group for moms with children with autism. Continue current dose of prozac.          Relevant Medications    hydrOXYzine HCL (ATARAX) 10 mg tablet    Severe depression (HCC)     Will add hydroxyzine as needed for anxiety and sleep, follow up in 2 weeks for physical. For now declines behavioral health. Recommend a support group for moms with children with autism. Continue current dose of prozac.          Relevant Medications    hydrOXYzine HCL (ATARAX) 10 mg tablet    Tension headache     Will give torodol today. Magnesium 400 mg, Riboflavin (B2) 400 mg,COQ10. Stay well hydrated 2000ml/day H2O. Get 7-8 hr of sleep. Limit alcohol.           Relevant Medications    ketorolac (TORADOL) injection 30 mg (Start on 12/26/2023  2:30 PM)     Other Visit Diagnoses       Screening mammogram, encounter for    -  Primary    please have mammo, will call with results.    Relevant Orders    Mammo screening bilateral w 3d & cad    Screening for cardiovascular condition        Have fasting lab work, return in 2 weeks for physical    Relevant Orders    CBC and differential    Comprehensive metabolic panel    Lipid panel    Hemoglobin A1C    TSH, 3rd generation with Free T4 reflex    Vitamin D deficiency        Have fasting lab work, return in 2 weeks for physical    Relevant Orders    Vitamin D 25 hydroxy

## 2024-01-20 ENCOUNTER — APPOINTMENT (OUTPATIENT)
Dept: LAB | Facility: HOSPITAL | Age: 44
End: 2024-01-20

## 2024-01-20 DIAGNOSIS — E55.9 VITAMIN D DEFICIENCY: ICD-10-CM

## 2024-01-20 DIAGNOSIS — Z13.6 SCREENING FOR CARDIOVASCULAR CONDITION: ICD-10-CM

## 2024-01-20 LAB
25(OH)D3 SERPL-MCNC: 16 NG/ML (ref 30–100)
ALBUMIN SERPL BCP-MCNC: 4.1 G/DL (ref 3.5–5)
ALP SERPL-CCNC: 72 U/L (ref 34–104)
ALT SERPL W P-5'-P-CCNC: 10 U/L (ref 7–52)
ANION GAP SERPL CALCULATED.3IONS-SCNC: 6 MMOL/L
AST SERPL W P-5'-P-CCNC: 12 U/L (ref 13–39)
BASOPHILS # BLD AUTO: 0.02 THOUSANDS/ÂΜL (ref 0–0.1)
BASOPHILS NFR BLD AUTO: 0 % (ref 0–1)
BILIRUB SERPL-MCNC: 0.44 MG/DL (ref 0.2–1)
BUN SERPL-MCNC: 9 MG/DL (ref 5–25)
CALCIUM SERPL-MCNC: 8.8 MG/DL (ref 8.4–10.2)
CHLORIDE SERPL-SCNC: 103 MMOL/L (ref 96–108)
CHOLEST SERPL-MCNC: 284 MG/DL
CO2 SERPL-SCNC: 27 MMOL/L (ref 21–32)
CREAT SERPL-MCNC: 0.77 MG/DL (ref 0.6–1.3)
EOSINOPHIL # BLD AUTO: 0.3 THOUSAND/ÂΜL (ref 0–0.61)
EOSINOPHIL NFR BLD AUTO: 6 % (ref 0–6)
ERYTHROCYTE [DISTWIDTH] IN BLOOD BY AUTOMATED COUNT: 12.7 % (ref 11.6–15.1)
EST. AVERAGE GLUCOSE BLD GHB EST-MCNC: 114 MG/DL
GFR SERPL CREATININE-BSD FRML MDRD: 95 ML/MIN/1.73SQ M
GLUCOSE P FAST SERPL-MCNC: 93 MG/DL (ref 65–99)
HBA1C MFR BLD: 5.6 %
HCT VFR BLD AUTO: 41.6 % (ref 34.8–46.1)
HDLC SERPL-MCNC: 47 MG/DL
HGB BLD-MCNC: 13.2 G/DL (ref 11.5–15.4)
IMM GRANULOCYTES # BLD AUTO: 0.01 THOUSAND/UL (ref 0–0.2)
IMM GRANULOCYTES NFR BLD AUTO: 0 % (ref 0–2)
LDLC SERPL CALC-MCNC: 210 MG/DL (ref 0–100)
LYMPHOCYTES # BLD AUTO: 2.29 THOUSANDS/ÂΜL (ref 0.6–4.47)
LYMPHOCYTES NFR BLD AUTO: 44 % (ref 14–44)
MCH RBC QN AUTO: 28.9 PG (ref 26.8–34.3)
MCHC RBC AUTO-ENTMCNC: 31.7 G/DL (ref 31.4–37.4)
MCV RBC AUTO: 91 FL (ref 82–98)
MONOCYTES # BLD AUTO: 0.36 THOUSAND/ÂΜL (ref 0.17–1.22)
MONOCYTES NFR BLD AUTO: 7 % (ref 4–12)
NEUTROPHILS # BLD AUTO: 2.22 THOUSANDS/ÂΜL (ref 1.85–7.62)
NEUTS SEG NFR BLD AUTO: 43 % (ref 43–75)
NONHDLC SERPL-MCNC: 237 MG/DL
NRBC BLD AUTO-RTO: 0 /100 WBCS
PLATELET # BLD AUTO: 313 THOUSANDS/UL (ref 149–390)
PMV BLD AUTO: 9.4 FL (ref 8.9–12.7)
POTASSIUM SERPL-SCNC: 3.8 MMOL/L (ref 3.5–5.3)
PROT SERPL-MCNC: 7.1 G/DL (ref 6.4–8.4)
RBC # BLD AUTO: 4.57 MILLION/UL (ref 3.81–5.12)
SODIUM SERPL-SCNC: 136 MMOL/L (ref 135–147)
TRIGL SERPL-MCNC: 134 MG/DL
TSH SERPL DL<=0.05 MIU/L-ACNC: 2.2 UIU/ML (ref 0.45–4.5)
WBC # BLD AUTO: 5.2 THOUSAND/UL (ref 4.31–10.16)

## 2024-01-20 PROCEDURE — 80053 COMPREHEN METABOLIC PANEL: CPT

## 2024-01-20 PROCEDURE — 83036 HEMOGLOBIN GLYCOSYLATED A1C: CPT

## 2024-01-20 PROCEDURE — 85025 COMPLETE CBC W/AUTO DIFF WBC: CPT

## 2024-01-20 PROCEDURE — 80061 LIPID PANEL: CPT

## 2024-01-20 PROCEDURE — 84443 ASSAY THYROID STIM HORMONE: CPT

## 2024-01-20 PROCEDURE — 36415 COLL VENOUS BLD VENIPUNCTURE: CPT

## 2024-01-20 PROCEDURE — 82306 VITAMIN D 25 HYDROXY: CPT

## 2024-01-22 DIAGNOSIS — E55.9 VITAMIN D DEFICIENCY: Primary | ICD-10-CM

## 2024-01-22 RX ORDER — ERGOCALCIFEROL 1.25 MG/1
50000 CAPSULE ORAL WEEKLY
Qty: 16 CAPSULE | Refills: 0 | Status: SHIPPED | OUTPATIENT
Start: 2024-01-22

## 2024-01-24 ENCOUNTER — OFFICE VISIT (OUTPATIENT)
Dept: FAMILY MEDICINE CLINIC | Facility: CLINIC | Age: 44
End: 2024-01-24

## 2024-01-24 VITALS
DIASTOLIC BLOOD PRESSURE: 82 MMHG | SYSTOLIC BLOOD PRESSURE: 118 MMHG | TEMPERATURE: 98 F | OXYGEN SATURATION: 99 % | HEART RATE: 80 BPM | WEIGHT: 222.2 LBS | BODY MASS INDEX: 39.37 KG/M2 | HEIGHT: 63 IN

## 2024-01-24 DIAGNOSIS — E78.5 HYPERLIPIDEMIA, UNSPECIFIED HYPERLIPIDEMIA TYPE: Primary | ICD-10-CM

## 2024-01-24 DIAGNOSIS — F41.0 PANIC DISORDER: ICD-10-CM

## 2024-01-24 DIAGNOSIS — F32.2 SEVERE DEPRESSION (HCC): ICD-10-CM

## 2024-01-24 DIAGNOSIS — Z00.00 ANNUAL PHYSICAL EXAM: ICD-10-CM

## 2024-01-24 DIAGNOSIS — E66.9 OBESITY (BMI 35.0-39.9 WITHOUT COMORBIDITY): ICD-10-CM

## 2024-01-24 DIAGNOSIS — Z59.89 UNINSURED: ICD-10-CM

## 2024-01-24 DIAGNOSIS — F42.2 MIXED OBSESSIONAL THOUGHTS AND ACTS: ICD-10-CM

## 2024-01-24 DIAGNOSIS — E55.9 VITAMIN D DEFICIENCY: ICD-10-CM

## 2024-01-24 PROCEDURE — 99214 OFFICE O/P EST MOD 30 MIN: CPT

## 2024-01-24 RX ORDER — HYDROXYZINE HYDROCHLORIDE 10 MG/1
10 TABLET, FILM COATED ORAL EVERY 6 HOURS PRN
Qty: 90 TABLET | Refills: 1 | Status: SHIPPED | OUTPATIENT
Start: 2024-01-24

## 2024-01-24 RX ORDER — ROSUVASTATIN CALCIUM 5 MG/1
5 TABLET, COATED ORAL DAILY
Qty: 30 TABLET | Refills: 5 | Status: SHIPPED | OUTPATIENT
Start: 2024-01-24

## 2024-01-24 RX ORDER — FLUOXETINE HYDROCHLORIDE 20 MG/1
20 CAPSULE ORAL DAILY
Qty: 90 CAPSULE | Refills: 1 | Status: SHIPPED | OUTPATIENT
Start: 2024-01-24

## 2024-01-24 SDOH — ECONOMIC STABILITY - INCOME SECURITY: OTHER PROBLEMS RELATED TO HOUSING AND ECONOMIC CIRCUMSTANCES: Z59.89

## 2024-01-24 NOTE — ASSESSMENT & PLAN NOTE
Increase exercise to 20 minutes daily,  cut down on high cholesterol foods such as full fat daily, butter, red meat, eggs, lopez/pork, mayonnaise and increase high fiber foods suck as oatmeal and vegetables. I also suggest increasing healthy fats such as olive oil, nuts/nut butters and avocados.

## 2024-01-24 NOTE — ASSESSMENT & PLAN NOTE
Cholesterol is still high, continue to try to cut down on high cholesterol foods such as full fat daily, butter, red meat, eggs, lopez/pork, mayonnaise and increase high fiber foods suck as oatmeal and vegetables. I also suggest increasing healthy fats such as olive oil, nuts/nut butters and avocados.   Will start crestor 5 mg in the evening. Follow up with lab work in 3 months.

## 2024-01-24 NOTE — PROGRESS NOTES
Assessment/Plan:         Problem List Items Addressed This Visit        Other    Panic disorder     Doing well, continue current treatment, follow up in 3 months.          Relevant Medications    FLUoxetine (PROzac) 20 mg capsule    hydrOXYzine HCL (ATARAX) 10 mg tablet    Mixed obsessional thoughts and acts     Doing well, continue current treatment, follow up in 3 months.          Relevant Medications    hydrOXYzine HCL (ATARAX) 10 mg tablet    Severe depression (HCC)     Doing well, continue current treatment, follow up in 3 months.          Relevant Medications    FLUoxetine (PROzac) 20 mg capsule    hydrOXYzine HCL (ATARAX) 10 mg tablet    Hyperlipidemia - Primary     Cholesterol is still high, continue to try to cut down on high cholesterol foods such as full fat daily, butter, red meat, eggs, lopez/pork, mayonnaise and increase high fiber foods suck as oatmeal and vegetables. I also suggest increasing healthy fats such as olive oil, nuts/nut butters and avocados.   Will start crestor 5 mg in the evening. Follow up with lab work in 3 months.          Relevant Medications    rosuvastatin (CRESTOR) 5 mg tablet    Other Relevant Orders    CBC and differential    Comprehensive metabolic panel    Lipid panel    Vitamin D deficiency     Vitamin D is low, will send weekly prescription and also suggest nightly D3 4000 u or more           Relevant Orders    Vitamin D 25 hydroxy         Subjective:      Patient ID: Lory Raman is a 43 y.o. female.    Lory is here for follow up, her medications are working well. She had lab work and cholesterol is very high.     Medication Refill  Pertinent negatives include no abdominal pain, arthralgias, chest pain, chills, congestion, coughing, fever, headaches, myalgias, nausea, rash, sore throat or vomiting.       The following portions of the patient's history were reviewed and updated as appropriate:   Past Medical History:  She has a past medical history of Mild persistent asthma  without complication.,  _______________________________________________________________________  Medical Problems:  does not have any pertinent problems on file.,  _______________________________________________________________________  Past Surgical History:   has a past surgical history that includes Hysterectomy (Bilateral, 04/2018).,  _______________________________________________________________________  Family History:  family history includes Breast cancer in her maternal aunt and sister; Hypertension in her mother.,  _______________________________________________________________________  Social History:   reports that she has never smoked. She has never used smokeless tobacco. She reports that she does not currently use alcohol. No history on file for drug use.,  _______________________________________________________________________  Allergies:  has No Known Allergies..  _______________________________________________________________________  Current Outpatient Medications   Medication Sig Dispense Refill   • albuterol (2.5 mg/3 mL) 0.083 % nebulizer solution Take 3 mL (2.5 mg total) by nebulization every 6 (six) hours as needed for wheezing or shortness of breath 75 mL 0   • ergocalciferol (VITAMIN D2) 50,000 units Take 1 capsule (50,000 Units total) by mouth once a week 16 capsule 0   • FLUoxetine (PROzac) 20 mg capsule Take 1 capsule (20 mg total) by mouth daily 90 capsule 1   • hydrOXYzine HCL (ATARAX) 10 mg tablet Take 1 tablet (10 mg total) by mouth every 6 (six) hours as needed for anxiety 90 tablet 1   • rosuvastatin (CRESTOR) 5 mg tablet Take 1 tablet (5 mg total) by mouth daily 30 tablet 5     No current facility-administered medications for this visit.     _______________________________________________________________________  Review of Systems   Constitutional:  Negative for chills and fever.   HENT:  Negative for congestion, ear pain, rhinorrhea, sinus pressure, sinus pain and sore throat.   "  Eyes:  Negative for pain and visual disturbance.   Respiratory:  Negative for cough, chest tightness, shortness of breath and wheezing.    Cardiovascular:  Negative for chest pain and palpitations.   Gastrointestinal:  Negative for abdominal pain, constipation, diarrhea, nausea and vomiting.   Genitourinary:  Negative for dysuria, frequency and hematuria.   Musculoskeletal:  Negative for arthralgias, back pain and myalgias.   Skin:  Negative for color change and rash.   Neurological:  Negative for dizziness, seizures, syncope, light-headedness and headaches.   Psychiatric/Behavioral:  Positive for sleep disturbance. Negative for dysphoric mood (6/10). The patient is not nervous/anxious (5/10).    All other systems reviewed and are negative.        Objective:  Vitals:    01/24/24 1258   BP: 118/82   BP Location: Left arm   Patient Position: Sitting   Cuff Size: Standard   Pulse: 80   Temp: 98 °F (36.7 °C)   TempSrc: Tympanic   SpO2: 99%   Weight: 101 kg (222 lb 3.2 oz)   Height: 5' 3\" (1.6 m)     Body mass index is 39.36 kg/m².     Physical Exam  Vitals and nursing note reviewed.   Constitutional:       Appearance: Normal appearance. She is not ill-appearing.   HENT:      Head: Normocephalic.      Right Ear: Tympanic membrane, ear canal and external ear normal. There is no impacted cerumen.      Left Ear: Tympanic membrane, ear canal and external ear normal. There is no impacted cerumen.      Nose: Nose normal. No congestion.      Mouth/Throat:      Mouth: Mucous membranes are moist.      Pharynx: No posterior oropharyngeal erythema.   Eyes:      Extraocular Movements: Extraocular movements intact.      Conjunctiva/sclera: Conjunctivae normal.      Pupils: Pupils are equal, round, and reactive to light.   Cardiovascular:      Rate and Rhythm: Normal rate and regular rhythm.      Heart sounds: Normal heart sounds. No murmur heard.  Pulmonary:      Effort: Pulmonary effort is normal.      Breath sounds: Normal breath " sounds. No wheezing.   Abdominal:      Palpations: Abdomen is soft.      Tenderness: There is no abdominal tenderness.   Musculoskeletal:         General: Normal range of motion.      Cervical back: Normal range of motion.   Skin:     General: Skin is warm and dry.   Neurological:      General: No focal deficit present.      Mental Status: She is alert.   Psychiatric:         Mood and Affect: Mood normal.         Behavior: Behavior normal.

## 2024-01-24 NOTE — PROGRESS NOTES
Lory is here for follow up, her medications are working well. She had lab work and cholesterol is very high.     Medication Refill  Associated symptoms include fatigue and nausea. Pertinent negatives include no abdominal pain, arthralgias, chest pain, chills, congestion, coughing, diaphoresis, fever, headaches, myalgias, rash, sore throat or vomiting.     ADULT ANNUAL PHYSICAL  [facility] [address] [facility]    NAME: Lory Raman  AGE: 43 y.o. SEX: female  : 1980     DATE: 2024     Assessment and Plan:     Problem List Items Addressed This Visit          Other    Panic disorder     Doing well, continue current treatment, follow up in 3 months.          Relevant Medications    FLUoxetine (PROzac) 20 mg capsule    hydrOXYzine HCL (ATARAX) 10 mg tablet    Mixed obsessional thoughts and acts     Doing well, continue current treatment, follow up in 3 months.          Relevant Medications    hydrOXYzine HCL (ATARAX) 10 mg tablet    Severe depression (HCC)     Doing well, continue current treatment, follow up in 3 months.          Relevant Medications    FLUoxetine (PROzac) 20 mg capsule    hydrOXYzine HCL (ATARAX) 10 mg tablet    Hyperlipidemia - Primary     Cholesterol is still high, continue to try to cut down on high cholesterol foods such as full fat daily, butter, red meat, eggs, lopez/pork, mayonnaise and increase high fiber foods suck as oatmeal and vegetables. I also suggest increasing healthy fats such as olive oil, nuts/nut butters and avocados.   Will start crestor 5 mg in the evening. Follow up with lab work in 3 months.          Relevant Medications    rosuvastatin (CRESTOR) 5 mg tablet    Other Relevant Orders    CBC and differential    Comprehensive metabolic panel    Lipid panel    Vitamin D deficiency     Vitamin D is low, will send weekly prescription and also suggest nightly D3 4000 u or more           Relevant Orders     Vitamin D 25 hydroxy    Obesity (BMI 35.0-39.9 without comorbidity)     Increase exercise to 20 minutes daily,  cut down on high cholesterol foods such as full fat daily, butter, red meat, eggs, lopez/pork, mayonnaise and increase high fiber foods suck as oatmeal and vegetables. I also suggest increasing healthy fats such as olive oil, nuts/nut butters and avocados.             Other Visit Diagnoses       Annual physical exam        Have lab work in 3 months, recommend 20 minutes of exercise most days. low cholesterol diet.    Uninsured        Relevant Orders    Ambulatory Referral to Complex Care Management Program            Immunizations and preventive care screenings were discussed with patient today. Appropriate education was printed on patient's after visit summary.    Counseling:  Alcohol/drug use: discussed moderation in alcohol intake, the recommendations for healthy alcohol use, and avoidance of illicit drug use.  Dental Health: discussed importance of regular tooth brushing, flossing, and dental visits.  Injury prevention: discussed safety/seat belts, safety helmets, smoke detectors, carbon dioxide detectors, and smoking near bedding or upholstery.  Sexual health: discussed sexually transmitted diseases, partner selection, use of condoms, avoidance of unintended pregnancy, and contraceptive alternatives.  Exercise: the importance of regular exercise/physical activity was discussed. Recommend exercise 3-5 times per week for at least 30 minutes.          Return in about 3 months (around 4/24/2024) for Annual physical.     Chief Complaint:     Chief Complaint   Patient presents with    Physical Exam    Medication Refill     On prozac      History of Present Illness:     Adult Annual Physical   Patient here for a comprehensive physical exam. The patient reports no problems.    Diet and Physical Activity  Diet/Nutrition: poor diet.   Exercise: no formal exercise.      Depression Screening  PHQ-2/9 Depression  Screening           General Health  Sleep: sleeps poorly.   Hearing: normal - bilateral.  Vision: no vision problems and most recent eye exam <1 year ago.   Dental: no dental visits for >1 year, brushes teeth twice daily, and flosses teeth occasionally.       /GYN Health  Follows with gynecology? no   Patient is: premenopausal  Last menstrual period: 1/1/24  Contraceptive method:  female sterilization .    Advanced Care Planning  Do you have an advanced directive? no  Do you have a durable medical power of ? no     Review of Systems:     Review of Systems   Constitutional:  Positive for fatigue. Negative for chills, diaphoresis and fever.   HENT:  Negative for congestion, ear pain, postnasal drip, rhinorrhea, sinus pain and sore throat.    Eyes:  Negative for pain and visual disturbance.   Respiratory:  Negative for cough, chest tightness and shortness of breath.    Cardiovascular:  Negative for chest pain and palpitations.   Gastrointestinal:  Positive for nausea. Negative for abdominal pain, constipation, diarrhea and vomiting.   Genitourinary:  Negative for dysuria, frequency, hematuria and urgency.   Musculoskeletal:  Negative for arthralgias, back pain and myalgias.   Skin:  Negative for color change and rash.   Neurological:  Negative for dizziness, seizures, syncope, light-headedness and headaches.   Psychiatric/Behavioral:  Positive for decreased concentration and sleep disturbance. The patient is nervous/anxious.    All other systems reviewed and are negative.     Past Medical History:     Past Medical History:   Diagnosis Date    Mild persistent asthma without complication       Past Surgical History:     Past Surgical History:   Procedure Laterality Date    HYSTERECTOMY Bilateral 04/2018      Social History:     Social History     Socioeconomic History    Marital status: /Civil Union     Spouse name: None    Number of children: None    Years of education: None    Highest education level:  "None   Occupational History    None   Tobacco Use    Smoking status: Never    Smokeless tobacco: Never   Substance and Sexual Activity    Alcohol use: Not Currently    Drug use: Never    Sexual activity: Yes   Other Topics Concern    None   Social History Narrative    None     Social Determinants of Health     Financial Resource Strain: Not on file   Food Insecurity: Not on file   Transportation Needs: Not on file   Physical Activity: Not on file   Stress: Not on file   Social Connections: Not on file   Intimate Partner Violence: Not on file   Housing Stability: Not on file      Family History:     Family History   Problem Relation Age of Onset    Hypertension Mother     Breast cancer Sister     Breast cancer Maternal Aunt       Current Medications:     Current Outpatient Medications   Medication Sig Dispense Refill    albuterol (2.5 mg/3 mL) 0.083 % nebulizer solution Take 3 mL (2.5 mg total) by nebulization every 6 (six) hours as needed for wheezing or shortness of breath 75 mL 0    ergocalciferol (VITAMIN D2) 50,000 units Take 1 capsule (50,000 Units total) by mouth once a week 16 capsule 0    FLUoxetine (PROzac) 20 mg capsule Take 1 capsule (20 mg total) by mouth daily 90 capsule 1    hydrOXYzine HCL (ATARAX) 10 mg tablet Take 1 tablet (10 mg total) by mouth every 6 (six) hours as needed for anxiety 90 tablet 1    rosuvastatin (CRESTOR) 5 mg tablet Take 1 tablet (5 mg total) by mouth daily 30 tablet 5     No current facility-administered medications for this visit.      Allergies:     No Known Allergies   Physical Exam:     /82 (BP Location: Left arm, Patient Position: Sitting, Cuff Size: Standard)   Pulse 80   Temp 98 °F (36.7 °C) (Tympanic)   Ht 5' 3\" (1.6 m)   Wt 101 kg (222 lb 3.2 oz)   SpO2 99%   BMI 39.36 kg/m²     Physical Exam  Vitals and nursing note reviewed.   Constitutional:       General: She is not in acute distress.     Appearance: She is well-developed. She is not ill-appearing. "   HENT:      Head: Normocephalic and atraumatic.      Right Ear: Tympanic membrane, ear canal and external ear normal. There is no impacted cerumen.      Left Ear: Tympanic membrane, ear canal and external ear normal. There is no impacted cerumen.      Nose: No congestion.      Mouth/Throat:      Mouth: Mucous membranes are moist.      Pharynx: No posterior oropharyngeal erythema.   Eyes:      Extraocular Movements: Extraocular movements intact.      Conjunctiva/sclera: Conjunctivae normal.      Pupils: Pupils are equal, round, and reactive to light.   Cardiovascular:      Rate and Rhythm: Normal rate and regular rhythm.      Heart sounds: No murmur heard.  Pulmonary:      Effort: Pulmonary effort is normal. No respiratory distress.      Breath sounds: Normal breath sounds.   Abdominal:      Palpations: Abdomen is soft.      Tenderness: There is no abdominal tenderness.   Musculoskeletal:         General: No swelling.      Cervical back: Neck supple.      Right lower leg: No edema.   Skin:     General: Skin is warm and dry.      Capillary Refill: Capillary refill takes less than 2 seconds.   Neurological:      Mental Status: She is alert.   Psychiatric:         Mood and Affect: Mood normal.          EDWARD Dotson  [facility] [address] [facility]

## 2024-01-24 NOTE — PATIENT INSTRUCTIONS
Problem List Items Addressed This Visit          Other    Panic disorder     Doing well, continue current treatment, follow up in 3 months.          Relevant Medications    FLUoxetine (PROzac) 20 mg capsule    hydrOXYzine HCL (ATARAX) 10 mg tablet    Mixed obsessional thoughts and acts     Doing well, continue current treatment, follow up in 3 months.          Relevant Medications    hydrOXYzine HCL (ATARAX) 10 mg tablet    Severe depression (HCC)     Doing well, continue current treatment, follow up in 3 months.          Relevant Medications    FLUoxetine (PROzac) 20 mg capsule    hydrOXYzine HCL (ATARAX) 10 mg tablet    Hyperlipidemia - Primary     Cholesterol is still high, continue to try to cut down on high cholesterol foods such as full fat daily, butter, red meat, eggs, lopez/pork, mayonnaise and increase high fiber foods suck as oatmeal and vegetables. I also suggest increasing healthy fats such as olive oil, nuts/nut butters and avocados.   Will start crestor 5 mg in the evening. Follow up with lab work in 3 months.          Relevant Medications    rosuvastatin (CRESTOR) 5 mg tablet    Other Relevant Orders    CBC and differential    Comprehensive metabolic panel    Lipid panel    Vitamin D deficiency     Vitamin D is low, will send weekly prescription and also suggest nightly D3 4000 u or more           Relevant Orders    Vitamin D 25 hydroxy    Obesity (BMI 35.0-39.9 without comorbidity)     Increase exercise to 20 minutes daily,  cut down on high cholesterol foods such as full fat daily, butter, red meat, eggs, lopez/pork, mayonnaise and increase high fiber foods suck as oatmeal and vegetables. I also suggest increasing healthy fats such as olive oil, nuts/nut butters and avocados.             Other Visit Diagnoses       Annual physical exam        Have lab work in 3 months, recommend 20 minutes of exercise most days. low cholesterol diet.    Uninsured        Relevant Orders    Ambulatory Referral to  Complex Care Management Program            Wellness Visit for Adults   AMBULATORY CARE:   A wellness visit  is when you see your healthcare provider to get screened for health problems. Your healthcare provider will also give you advice on how to stay healthy. Write down your questions so you remember to ask them. Ask your healthcare provider how often you should have a wellness visit.  What happens at a wellness visit:  Your healthcare provider will ask about your health, and your family history of health problems. This includes high blood pressure, heart disease, and cancer. He or she will ask if you have symptoms that concern you, if you smoke, and about your mood. You may also be asked about your intake of medicines, supplements, food, and alcohol. Any of the following may be done:  Your weight  will be checked. Your height may also be checked so your body mass index (BMI) can be calculated. Your BMI shows if you are at a healthy weight.    Your blood pressure  and heart rate will be checked. Your temperature may also be checked.    Blood and urine tests  may be done. Blood tests may be done to check your cholesterol levels. Abnormal cholesterol levels increase your risk for heart disease and stroke. You may also need a blood or urine test to check for diabetes if you are at increased risk. Urine tests may be done to look for signs of an infection or kidney disease.    A physical exam  includes checking your heartbeat and lungs with a stethoscope. Your healthcare provider may also check your skin to look for sun damage.    Screening tests  may be recommended. A screening test is done to check for diseases that may not cause symptoms. The screening tests you may need depend on your age, gender, family history, and lifestyle habits. For example, colorectal screening may be recommended if you are 50 years old or older.    Screening tests you need if you are a woman:   A Pap smear  is used to screen for cervical  cancer. Pap smears are usually done every 3 to 5 years depending on your age. You may need them more often if you have had abnormal Pap smear test results in the past. Ask your healthcare provider how often you should have a Pap smear.    A mammogram  is an x-ray of your breasts to screen for breast cancer. Experts recommend mammograms every 2 years starting at age 50 years. You may need a mammogram at age 49 years or younger if you have an increased risk for breast cancer. Talk to your healthcare provider about when you should start having mammograms and how often you need them.    Vaccines you may need:   Get an influenza vaccine  every year. The influenza vaccine protects you from the flu. Several types of viruses cause the flu. The viruses change over time, so new vaccines are made each year.    Get a tetanus-diphtheria (Td) booster vaccine  every 10 years. This vaccine protects you against tetanus and diphtheria. Tetanus is a severe infection that may cause painful muscle spasms and lockjaw. Diphtheria is a severe bacterial infection that causes a thick covering in the back of your mouth and throat.    Get a human papillomavirus (HPV) vaccine  if you are female and aged 19 to 26 or male 19 to 21 and never received it. This vaccine protects you from HPV infection. HPV is the most common infection spread by sexual contact. HPV may also cause vaginal, penile, and anal cancers.    Get a pneumococcal vaccine  if you are aged 65 years or older. The pneumococcal vaccine is an injection given to protect you from pneumococcal disease. Pneumococcal disease is an infection caused by pneumococcal bacteria. The infection may cause pneumonia, meningitis, or an ear infection.    Get a shingles vaccine  if you are 60 or older, even if you have had shingles before. The shingles vaccine is an injection to protect you from the varicella-zoster virus. This is the same virus that causes chickenpox. Shingles is a painful rash that  develops in people who had chickenpox or have been exposed to the virus.    How to eat healthy:  My Plate is a model for planning healthy meals. It shows the types and amounts of foods that should go on your plate. Fruits and vegetables make up about half of your plate, and grains and protein make up the other half. A serving of dairy is included on the side of your plate. The amount of calories and serving sizes you need depends on your age, gender, weight, and height. Examples of healthy foods are listed below:  Eat a variety of vegetables  such as dark green, red, and orange vegetables. You can also include canned vegetables low in sodium (salt) and frozen vegetables without added butter or sauces.    Eat a variety of fresh fruits , canned fruit in 100% juice, frozen fruit, and dried fruit.    Include whole grains.  At least half of the grains you eat should be whole grains. Examples include whole-wheat bread, wheat pasta, brown rice, and whole-grain cereals such as oatmeal.    Eat a variety of protein foods such as seafood (fish and shellfish), lean meat, and poultry without skin (turkey and chicken). Examples of lean meats include pork leg, shoulder, or tenderloin, and beef round, sirloin, tenderloin, and extra lean ground beef. Other protein foods include eggs and egg substitutes, beans, peas, soy products, nuts, and seeds.    Choose low-fat dairy products such as skim or 1% milk or low-fat yogurt, cheese, and cottage cheese.    Limit unhealthy fats  such as butter, hard margarine, and shortening.       Exercise:  Exercise at least 30 minutes per day on most days of the week. Some examples of exercise include walking, biking, dancing, and swimming. You can also fit in more physical activity by taking the stairs instead of the elevator or parking farther away from stores. Include muscle strengthening activities 2 days each week. Regular exercise provides many health benefits. It helps you manage your weight,  and decreases your risk for type 2 diabetes, heart disease, stroke, and high blood pressure. Exercise can also help improve your mood. Ask your healthcare provider about the best exercise plan for you.       General health and safety guidelines:   Do not smoke.  Nicotine and other chemicals in cigarettes and cigars can cause lung damage. Ask your healthcare provider for information if you currently smoke and need help to quit. E-cigarettes or smokeless tobacco still contain nicotine. Talk to your healthcare provider before you use these products.    Limit alcohol.  A drink of alcohol is 12 ounces of beer, 5 ounces of wine, or 1½ ounces of liquor.    Lose weight, if needed.  Being overweight increases your risk of certain health conditions. These include heart disease, high blood pressure, type 2 diabetes, and certain types of cancer.    Protect your skin.  Do not sunbathe or use tanning beds. Use sunscreen with a SPF 15 or higher. Apply sunscreen at least 15 minutes before you go outside. Reapply sunscreen every 2 hours. Wear protective clothing, hats, and sunglasses when you are outside.    Drive safely.  Always wear your seatbelt. Make sure everyone in your car wears a seatbelt. A seatbelt can save your life if you are in an accident. Do not use your cell phone when you are driving. This could distract you and cause an accident. Pull over if you need to make a call or send a text message.    Practice safe sex.  Use latex condoms if are sexually active and have more than one partner. Your healthcare provider may recommend screening tests for sexually transmitted infections (STIs).    Wear helmets, lifejackets, and protective gear.  Always wear a helmet when you ride a bike or motorcycle, go skiing, or play sports that could cause a head injury. Wear protective equipment when you play sports. Wear a lifejacket when you are on a boat or doing water sports.    © Copyright [employer] 2023 Information is for End User's  use only and may not be sold, redistributed or otherwise used for commercial purposes.  The above information is an  only. It is not intended as medical advice for individual conditions or treatments. Talk to your doctor, nurse or pharmacist before following any medical regimen to see if it is safe and effective for you.

## 2024-01-26 ENCOUNTER — PATIENT OUTREACH (OUTPATIENT)
Dept: FAMILY MEDICINE CLINIC | Facility: CLINIC | Age: 44
End: 2024-01-26

## 2024-01-26 NOTE — PROGRESS NOTES
Outpatient Care Management Note:  Direct PCP referral for complex outpatient care management was identified in Dept [facility] on 1/24/24 .  Chart review completed.   Admission or ED risk score is 7.    PMH includes depression, panic disorder, obesity with BMI greater than 35, hyperlipidemia.    In addition to above medical history, patient has SDOH needs which are impacting utilization.     Telephone outreach  made to introduce self and role of outpatient care management, follow up on general health, and outreach for any possible medical or psychosocial services needed.  Patient informs me that she is at [email or web address] and requests that I call back in 1 hour.     3:35 PM  PC made to patient.  Spoke with patient.  Patient informs me she does not have insurance but has applied for  state medical insurance and is waiting on reply from Medical Assistance Office on her Medicaid application.  Patient has also applied for SSD and is awaiting outcome.    Patient reports her spouse and children all have state medical insurance.  She is unsure as to why she was not previously enrolled.     Patient states her depression is being better managed now that she is back on medications.  She is recognizing importance of self-care in order to be able to care for her 2 children.     Patient declines having any other needs or concerns at this time.  Patient has my contact information and was encouraged to contact me should any future medical needs / concerns arise.     IB Message sent to PCP for case communication.  Referral closed and I removed myself from care team.

## 2024-04-22 ENCOUNTER — TELEPHONE (OUTPATIENT)
Dept: FAMILY MEDICINE CLINIC | Facility: CLINIC | Age: 44
End: 2024-04-22

## 2024-04-22 DIAGNOSIS — R92.8 ABNORMAL MAMMOGRAM: Primary | ICD-10-CM

## 2024-04-22 NOTE — TELEPHONE ENCOUNTER
Please put referral in for a Diagnostic mammo right breast with possible ultrasound R92.8 fax [phone]

## 2024-04-24 ENCOUNTER — TELEPHONE (OUTPATIENT)
Dept: FAMILY MEDICINE CLINIC | Facility: CLINIC | Age: 44
End: 2024-04-24

## 2024-04-24 NOTE — TELEPHONE ENCOUNTER
----- Message from EDWARD Dotson sent at 4/23/2024  7:22 AM EDT -----  They wanted to do an additional mammo and ultrasound, does she need me to order these

## 2024-08-25 DIAGNOSIS — F32.2 SEVERE DEPRESSION (HCC): ICD-10-CM

## 2024-10-09 DIAGNOSIS — F41.0 PANIC DISORDER: ICD-10-CM

## 2024-10-09 DIAGNOSIS — F32.2 SEVERE DEPRESSION (HCC): ICD-10-CM

## 2024-10-09 DIAGNOSIS — F42.2 MIXED OBSESSIONAL THOUGHTS AND ACTS: ICD-10-CM

## 2024-11-05 ENCOUNTER — OFFICE VISIT (OUTPATIENT)
Dept: FAMILY MEDICINE CLINIC | Facility: CLINIC | Age: 44
End: 2024-11-05

## 2024-11-05 VITALS
SYSTOLIC BLOOD PRESSURE: 126 MMHG | HEART RATE: 73 BPM | OXYGEN SATURATION: 99 % | WEIGHT: 224 LBS | BODY MASS INDEX: 39.69 KG/M2 | HEIGHT: 63 IN | DIASTOLIC BLOOD PRESSURE: 78 MMHG

## 2024-11-05 DIAGNOSIS — N92.1 MENORRHAGIA WITH IRREGULAR CYCLE: ICD-10-CM

## 2024-11-05 DIAGNOSIS — F42.2 MIXED OBSESSIONAL THOUGHTS AND ACTS: Primary | ICD-10-CM

## 2024-11-05 PROCEDURE — 99214 OFFICE O/P EST MOD 30 MIN: CPT

## 2024-11-05 RX ORDER — VENLAFAXINE HYDROCHLORIDE 75 MG/1
75 CAPSULE, EXTENDED RELEASE ORAL
Qty: 30 CAPSULE | Refills: 1 | Status: SHIPPED | OUTPATIENT
Start: 2024-11-05

## 2024-11-05 NOTE — ASSESSMENT & PLAN NOTE
Will straight switch from prozac to effexor, again recommend talk therapy. It can take 4-6 weeks for this medication to be fully therapeutic. Call with any affects.  Follow up in 4 weeks or sooner if needed.

## 2024-11-05 NOTE — PROGRESS NOTES
Assessment/Plan:         Problem List Items Addressed This Visit          Behavioral Health    Mixed obsessional thoughts and acts - Primary     Will straight switch from prozac to effexor, again recommend talk therapy. It can take 4-6 weeks for this medication to be fully therapeutic. Call with any affects.  Follow up in 4 weeks or sooner if needed.          Relevant Medications    venlafaxine (EFFEXOR-XR) 75 mg 24 hr capsule     Other Visit Diagnoses       Menorrhagia with irregular cycle        Irregular menstration, will check iron with lab work.  Suspect perimenopausal.  Will continue to monitor    Relevant Orders    Iron Panel (Includes Ferritin, Iron Sat%, Iron, and TIBC)              Subjective:      Patient ID: Lory Raman is a 44 y.o. female.    Lory is here for follow up, she has good days and bad days, she feels like a roller coaster, up and down. She is not having thoughts as much of dying. Sleeping at night is ok, but she is also very sleepy during the day.          The following portions of the patient's history were reviewed and updated as appropriate:   Past Medical History:  She has a past medical history of Mild persistent asthma without complication.,  _______________________________________________________________________  Medical Problems:  does not have any pertinent problems on file.,  _______________________________________________________________________  Past Surgical History:   has a past surgical history that includes Hysterectomy (Bilateral, 04/2018).,  _______________________________________________________________________  Family History:  family history includes Breast cancer in her maternal aunt and sister; Hypertension in her mother.,  _______________________________________________________________________  Social History:   reports that she has never smoked. She has never used smokeless tobacco. She reports that she does not currently use alcohol. She reports that she does not use  drugs.,  _______________________________________________________________________  Allergies:  has No Known Allergies..  _______________________________________________________________________  Current Outpatient Medications   Medication Sig Dispense Refill    venlafaxine (EFFEXOR-XR) 75 mg 24 hr capsule Take 1 capsule (75 mg total) by mouth daily with breakfast 30 capsule 1    albuterol (2.5 mg/3 mL) 0.083 % nebulizer solution Take 3 mL (2.5 mg total) by nebulization every 6 (six) hours as needed for wheezing or shortness of breath 75 mL 0    ergocalciferol (VITAMIN D2) 50,000 units Take 1 capsule (50,000 Units total) by mouth once a week 16 capsule 0    hydrOXYzine HCL (ATARAX) 10 mg tablet Take 1 tablet (10 mg total) by mouth every 6 (six) hours as needed for anxiety 90 tablet 1    rosuvastatin (CRESTOR) 5 mg tablet Take 1 tablet (5 mg total) by mouth daily 30 tablet 5     No current facility-administered medications for this visit.     _______________________________________________________________________  Review of Systems   Constitutional:  Positive for fatigue. Negative for chills, diaphoresis and fever.   HENT:  Negative for congestion, ear pain, rhinorrhea, sinus pressure, sinus pain and sore throat.    Respiratory:  Negative for cough, chest tightness, shortness of breath and wheezing.    Cardiovascular:  Negative for chest pain and palpitations.   Gastrointestinal:  Negative for abdominal pain, constipation, diarrhea, nausea and vomiting.   Genitourinary:  Positive for menstrual problem.   Musculoskeletal:  Positive for arthralgias. Negative for myalgias.   Skin:  Negative for rash.   Neurological:  Positive for headaches. Negative for dizziness, syncope and light-headedness.   Psychiatric/Behavioral:  Positive for dysphoric mood and sleep disturbance. The patient is nervous/anxious.    All other systems reviewed and are negative.        Objective:  Vitals:    11/05/24 1147   BP: 126/78   Pulse: 73  "  SpO2: 99%   Weight: 102 kg (224 lb)   Height: 5' 3\" (1.6 m)     Body mass index is 39.68 kg/m².     Physical Exam  Vitals and nursing note reviewed.   Constitutional:       Appearance: Normal appearance. She is not ill-appearing.   HENT:      Head: Normocephalic.      Right Ear: Tympanic membrane, ear canal and external ear normal. There is no impacted cerumen.      Left Ear: Tympanic membrane, ear canal and external ear normal. There is no impacted cerumen.      Nose: Nose normal. No congestion.      Mouth/Throat:      Mouth: Mucous membranes are moist.      Pharynx: No posterior oropharyngeal erythema.   Eyes:      Extraocular Movements: Extraocular movements intact.      Conjunctiva/sclera: Conjunctivae normal.      Pupils: Pupils are equal, round, and reactive to light.   Cardiovascular:      Rate and Rhythm: Normal rate and regular rhythm.      Heart sounds: Normal heart sounds. No murmur heard.  Pulmonary:      Effort: Pulmonary effort is normal.      Breath sounds: Normal breath sounds. No wheezing.   Abdominal:      Palpations: Abdomen is soft.      Tenderness: There is no abdominal tenderness.   Musculoskeletal:         General: Normal range of motion.      Cervical back: Normal range of motion.      Right lower leg: No edema.      Left lower leg: No edema.   Skin:     General: Skin is warm and dry.   Neurological:      General: No focal deficit present.      Mental Status: She is alert.   Psychiatric:         Mood and Affect: Mood normal.         Behavior: Behavior normal.         "

## 2024-11-15 ENCOUNTER — HOSPITAL ENCOUNTER (EMERGENCY)
Facility: HOSPITAL | Age: 44
Discharge: HOME | End: 2024-11-15

## 2024-11-15 VITALS
DIASTOLIC BLOOD PRESSURE: 92 MMHG | RESPIRATION RATE: 18 BRPM | BODY MASS INDEX: 39.4 KG/M2 | OXYGEN SATURATION: 100 % | SYSTOLIC BLOOD PRESSURE: 184 MMHG | WEIGHT: 222.44 LBS | HEART RATE: 95 BPM | TEMPERATURE: 98.3 F

## 2024-11-15 DIAGNOSIS — K64.9 HEMORRHOIDS: Primary | ICD-10-CM

## 2024-11-15 PROCEDURE — 99284 EMERGENCY DEPT VISIT MOD MDM: CPT

## 2024-11-15 PROCEDURE — 99282 EMERGENCY DEPT VISIT SF MDM: CPT

## 2024-11-15 RX ORDER — HYDROCORTISONE ACETATE 25 MG/1
25 SUPPOSITORY RECTAL 2 TIMES DAILY
Qty: 12 SUPPOSITORY | Refills: 0 | Status: SHIPPED | OUTPATIENT
Start: 2024-11-15

## 2024-11-15 RX ORDER — LIDOCAINE HYDROCHLORIDE 20 MG/ML
1 JELLY TOPICAL ONCE
Status: COMPLETED | OUTPATIENT
Start: 2024-11-15 | End: 2024-11-15

## 2024-11-15 RX ORDER — HYDROCORTISONE 25 MG/G
CREAM TOPICAL 2 TIMES DAILY
Qty: 28 G | Refills: 0 | Status: SHIPPED | OUTPATIENT
Start: 2024-11-15

## 2024-11-15 RX ADMIN — LIDOCAINE HYDROCHLORIDE 1 APPLICATION: 20 JELLY TOPICAL at 09:44

## 2024-11-15 NOTE — DISCHARGE INSTRUCTIONS
Hydrocortisone suppository- twice a day for pain  May use topical ointment for relief  Increase fluid and fiber

## 2024-11-15 NOTE — ED PROVIDER NOTES
Time reflects when diagnosis was documented in both MDM as applicable and the Disposition within this note       Time User Action Codes Description Comment    11/15/2024  9:27 AM Maria Antonia Toledo Add [K64.9] Hemorrhoids     11/15/2024  9:27 AM Maria Antonia Toledo Add [K64.4] External hemorrhoid     11/15/2024  9:28 AM Maria Antonia Toledo Remove [K64.4] External hemorrhoid           ED Disposition       ED Disposition   Discharge    Condition   Stable    Date/Time   Fri Nov 15, 2024  9:28 AM    Comment   Lory Raman discharge to home/self care.                   Assessment & Plan       Medical Decision Making  44-year-old female patient presents the ER for evaluation of external hemorrhoids.  Patient has had history of hemorrhoids since having her daughter.  She reports intermittent exacerbations of the external hemorrhoids.  She reports that typically she buys over-the-counter medications and at this time her Preparation H is not relieving her pain.  Patient was given Uroject while in the ER and sent home with rectal suppositories as well as hydrocortisone cream.  Educated patient about supportive treatment.  Recommended following up with primary care provider and if she were not to get relief come back to the ER for further evaluation.  Hemorrhoids are not thrombosed at this time and no need for general surgery consultation.    Risk  Prescription drug management.             Medications   lidocaine (URO-JET) 2 % urethral/mucosal gel 1 Application (1 Application Urethral Given 11/15/24 0944)       ED Risk Strat Scores                           SBIRT 20yo+      Flowsheet Row Most Recent Value   Initial Alcohol Screen: US AUDIT-C     1. How often do you have a drink containing alcohol? 0 Filed at: 11/15/2024 0918   2. How many drinks containing alcohol do you have on a typical day you are drinking?  0 Filed at: 11/15/2024 0918   3b. FEMALE Any Age, or MALE 65+: How often do you have 4 or more drinks on one occassion? 0 Filed at:  11/15/2024 0918   Audit-C Score 0 Filed at: 11/15/2024 0918   YANNI: How many times in the past year have you...    Used an illegal drug or used a prescription medication for non-medical reasons? Never Filed at: 11/15/2024 0918                            History of Present Illness       Chief Complaint   Patient presents with    Hemorrhoids     Hemorrhoids for the past week, OTC meds provided little relief. Small amount of blood during BM.        Past Medical History:   Diagnosis Date    Mild persistent asthma without complication       Past Surgical History:   Procedure Laterality Date    HYSTERECTOMY Bilateral 04/2018      Family History   Problem Relation Age of Onset    Hypertension Mother     Breast cancer Sister     Breast cancer Maternal Aunt       Social History     Tobacco Use    Smoking status: Never    Smokeless tobacco: Never   Substance Use Topics    Alcohol use: Not Currently    Drug use: Never      E-Cigarette/Vaping      E-Cigarette/Vaping Substances      I have reviewed and agree with the history as documented.     44-year-old female patient presents the ER for evaluation of hemorrhoids.  Patient stated that she has had intermittent hemorrhoids since being pregnant with her daughter.  She states that typically she buys over-the-counter medications and her hemorrhoids resolved.  She reports that she has been having constant pain worse with a sitting position for the last 3 days.  She reports that when she wipes after having a bowel movement she has streaks on the toilet paper.  No noted abdominal pain, fever, chills, nausea or vomiting.  Patient does not take blood thinners.           Review of Systems   Constitutional:  Negative for chills and fever.   HENT:  Negative for ear pain and sore throat.    Eyes:  Negative for pain and visual disturbance.   Respiratory:  Negative for cough and shortness of breath.    Cardiovascular:  Negative for chest pain and palpitations.   Gastrointestinal:  Positive  for rectal pain. Negative for abdominal pain and vomiting.   Genitourinary:  Negative for dysuria and hematuria.   Musculoskeletal:  Negative for arthralgias and back pain.   Skin:  Negative for color change and rash.   Neurological:  Negative for seizures and syncope.   All other systems reviewed and are negative.          Objective       ED Triage Vitals [11/15/24 0845]   Temperature Pulse Blood Pressure Respirations SpO2 Patient Position - Orthostatic VS   98.3 °F (36.8 °C) 95 (!) 184/92 18 100 % Sitting      Temp Source Heart Rate Source BP Location FiO2 (%) Pain Score    Oral Monitor Left arm -- --      Vitals      Date and Time Temp Pulse SpO2 Resp BP Pain Score FACES Pain Rating User   11/15/24 0845 98.3 °F (36.8 °C) 95 100 % 18 184/92 -- -- MR            Physical Exam  Vitals and nursing note reviewed. Exam conducted with a chaperone present (NATHAN Go).   Constitutional:       General: She is not in acute distress.     Appearance: She is well-developed.   HENT:      Head: Normocephalic and atraumatic.      Right Ear: External ear normal.      Left Ear: External ear normal.   Eyes:      Conjunctiva/sclera: Conjunctivae normal.   Cardiovascular:      Rate and Rhythm: Normal rate and regular rhythm.      Heart sounds: No murmur heard.  Pulmonary:      Effort: Pulmonary effort is normal. No respiratory distress.      Breath sounds: Normal breath sounds.   Abdominal:      Palpations: Abdomen is soft.      Tenderness: There is no abdominal tenderness.   Genitourinary:     Rectum: External hemorrhoid present.   Musculoskeletal:         General: No swelling.      Cervical back: Neck supple.   Skin:     General: Skin is warm and dry.      Capillary Refill: Capillary refill takes less than 2 seconds.   Neurological:      Mental Status: She is alert.   Psychiatric:         Mood and Affect: Mood normal.         Results Reviewed       None            No orders to display       Procedures    ED Medication and Procedure  Management   Prior to Admission Medications   Prescriptions Last Dose Informant Patient Reported? Taking?   albuterol (2.5 mg/3 mL) 0.083 % nebulizer solution   No No   Sig: Take 3 mL (2.5 mg total) by nebulization every 6 (six) hours as needed for wheezing or shortness of breath   ergocalciferol (VITAMIN D2) 50,000 units   No No   Sig: Take 1 capsule (50,000 Units total) by mouth once a week   hydrOXYzine HCL (ATARAX) 10 mg tablet   No No   Sig: Take 1 tablet (10 mg total) by mouth every 6 (six) hours as needed for anxiety   rosuvastatin (CRESTOR) 5 mg tablet   No No   Sig: Take 1 tablet (5 mg total) by mouth daily   venlafaxine (EFFEXOR-XR) 75 mg 24 hr capsule   No No   Sig: Take 1 capsule (75 mg total) by mouth daily with breakfast      Facility-Administered Medications: None     Discharge Medication List as of 11/15/2024  9:49 AM        START taking these medications    Details   hydrocortisone (ANUSOL-HC) 2.5 % rectal cream Apply topically 2 (two) times a day, Starting Fri 11/15/2024, Normal      hydrocortisone (ANUSOL-HC) 25 mg suppository Insert 1 suppository (25 mg total) into the rectum 2 (two) times a day, Starting Fri 11/15/2024, Normal           CONTINUE these medications which have NOT CHANGED    Details   albuterol (2.5 mg/3 mL) 0.083 % nebulizer solution Take 3 mL (2.5 mg total) by nebulization every 6 (six) hours as needed for wheezing or shortness of breath, Starting Mon 11/27/2023, Normal      ergocalciferol (VITAMIN D2) 50,000 units Take 1 capsule (50,000 Units total) by mouth once a week, Starting Mon 1/22/2024, Normal      hydrOXYzine HCL (ATARAX) 10 mg tablet Take 1 tablet (10 mg total) by mouth every 6 (six) hours as needed for anxiety, Starting Wed 1/24/2024, Normal      rosuvastatin (CRESTOR) 5 mg tablet Take 1 tablet (5 mg total) by mouth daily, Starting Wed 1/24/2024, Normal      venlafaxine (EFFEXOR-XR) 75 mg 24 hr capsule Take 1 capsule (75 mg total) by mouth daily with breakfast,  Starting Tue 11/5/2024, Normal           No discharge procedures on file.  ED SEPSIS DOCUMENTATION   Time reflects when diagnosis was documented in both MDM as applicable and the Disposition within this note       Time User Action Codes Description Comment    11/15/2024  9:27 AM Maria Antonia Toledo Add [K64.9] Hemorrhoids     11/15/2024  9:27 AM Maria Antonia Toledo Add [K64.4] External hemorrhoid     11/15/2024  9:28 AM Maria Antonia Toledo Remove [K64.4] External hemorrhoid                  EDWARD Sheppard  11/15/24 1229

## 2024-11-21 ENCOUNTER — TELEPHONE (OUTPATIENT)
Age: 44
End: 2024-11-21

## 2024-11-21 ENCOUNTER — HOSPITAL ENCOUNTER (EMERGENCY)
Facility: HOSPITAL | Age: 44
Discharge: HOME | End: 2024-11-21
Attending: EMERGENCY MEDICINE

## 2024-11-21 VITALS
SYSTOLIC BLOOD PRESSURE: 135 MMHG | WEIGHT: 223.33 LBS | DIASTOLIC BLOOD PRESSURE: 95 MMHG | OXYGEN SATURATION: 99 % | BODY MASS INDEX: 39.57 KG/M2 | HEIGHT: 63 IN | TEMPERATURE: 97.6 F | RESPIRATION RATE: 20 BRPM | HEART RATE: 105 BPM

## 2024-11-21 DIAGNOSIS — K62.89 RECTAL PAIN: ICD-10-CM

## 2024-11-21 DIAGNOSIS — K64.4 EXTERNAL HEMORRHOID: Primary | ICD-10-CM

## 2024-11-21 PROCEDURE — 99282 EMERGENCY DEPT VISIT SF MDM: CPT

## 2024-11-21 PROCEDURE — 99284 EMERGENCY DEPT VISIT MOD MDM: CPT | Performed by: EMERGENCY MEDICINE

## 2024-11-21 RX ORDER — LIDOCAINE 40 MG/G
CREAM TOPICAL ONCE
Status: COMPLETED | OUTPATIENT
Start: 2024-11-21 | End: 2024-11-21

## 2024-11-21 RX ORDER — HYDROCODONE BITARTRATE AND ACETAMINOPHEN 5; 325 MG/1; MG/1
1 TABLET ORAL ONCE
Refills: 0 | Status: COMPLETED | OUTPATIENT
Start: 2024-11-21 | End: 2024-11-21

## 2024-11-21 RX ORDER — LIDOCAINE/PRILOCAINE 2.5 %-2.5%
CREAM (GRAM) TOPICAL 3 TIMES DAILY PRN
Qty: 30 G | Refills: 0 | Status: SHIPPED | OUTPATIENT
Start: 2024-11-21

## 2024-11-21 RX ADMIN — HYDROCODONE BITARTRATE AND ACETAMINOPHEN 1 TABLET: 5; 325 TABLET ORAL at 10:50

## 2024-11-21 RX ADMIN — LIDOCAINE: 40 CREAM TOPICAL at 10:50

## 2024-11-21 NOTE — ED PROVIDER NOTES
Time reflects when diagnosis was documented in both MDM as applicable and the Disposition within this note       Time User Action Codes Description Comment    11/21/2024 11:33 AM Ngoc Cool Add [K64.4] External hemorrhoid     11/21/2024 11:33 AM Ngoc Cool Add [K62.89] Rectal pain           ED Disposition       ED Disposition   Discharge    Condition   Stable    Date/Time   Thu Nov 21, 2024 11:33 AM    Comment   Lory Raman discharge to home/self care.                   Assessment & Plan       Medical Decision Making  Patient is a 44 yoF who presents with persistent hemorrhoid and associated pain. Notes ongoing bleeding with bowel movements. Does not appear to be completely thrombosed at this time. Will treat symptomatically with topical cream, pain medication, and stool softeners. Will likely require referral for banding/surgical intervention.     Amount and/or Complexity of Data Reviewed  External Data Reviewed: notes.     Details: Previous chart reviewed.  Nonthrombosed at that time.  Given topical cream.    Risk  OTC drugs.  Prescription drug management.        ED Course as of 11/21/24 1423   Thu Nov 21, 2024   1133 Symptomatically improved       Medications   HYDROcodone-acetaminophen (NORCO) 5-325 mg per tablet 1 tablet (1 tablet Oral Given 11/21/24 1050)   lidocaine (LMX) 4 % cream ( Topical Given 11/21/24 1050)       ED Risk Strat Scores                                               History of Present Illness       Chief Complaint   Patient presents with    Hemorrhoids     Pt arrived ambulatory c/o hemorrhoids was seen here last week for same. Pt reports cream prescribed but it is not getting better and still painful. Pt unable to sit. 10/10 pain.        Past Medical History:   Diagnosis Date    Mild persistent asthma without complication       Past Surgical History:   Procedure Laterality Date    HYSTERECTOMY Bilateral 04/2018      Family History   Problem Relation Age of Onset     Hypertension Mother     Breast cancer Sister     Breast cancer Maternal Aunt       Social History     Tobacco Use    Smoking status: Never    Smokeless tobacco: Never   Substance Use Topics    Alcohol use: Not Currently    Drug use: Never      E-Cigarette/Vaping      E-Cigarette/Vaping Substances      I have reviewed and agree with the history as documented.     Presents with persistent pain related to hemorrhoids.          Review of Systems   Gastrointestinal:  Positive for anal bleeding and rectal pain.           Objective       ED Triage Vitals   Temperature Pulse Blood Pressure Respirations SpO2 Patient Position - Orthostatic VS   11/21/24 0910 11/21/24 0910 11/21/24 0910 11/21/24 0910 11/21/24 0910 11/21/24 0910   97.6 °F (36.4 °C) 105 135/95 20 99 % Standing      Temp Source Heart Rate Source BP Location FiO2 (%) Pain Score    11/21/24 0910 11/21/24 0910 11/21/24 0910 -- 11/21/24 1050    Tympanic Monitor Left arm  10 - Worst Possible Pain      Vitals      Date and Time Temp Pulse SpO2 Resp BP Pain Score FACES Pain Rating User   11/21/24 1050 -- -- -- -- -- 10 - Worst Possible Pain -- EN   11/21/24 0910 97.6 °F (36.4 °C) 105 99 % 20 135/95 -- -- LA            Physical Exam  Vitals and nursing note reviewed.   Constitutional:       General: She is not in acute distress.     Appearance: Normal appearance.   HENT:      Head: Normocephalic and atraumatic.      Right Ear: External ear normal.      Left Ear: External ear normal.      Nose: Nose normal.   Cardiovascular:      Rate and Rhythm: Normal rate and regular rhythm.   Pulmonary:      Effort: Pulmonary effort is normal.      Breath sounds: Normal breath sounds.   Genitourinary:     Rectum: External hemorrhoid present.   Musculoskeletal:      Right lower leg: No edema.      Left lower leg: No edema.   Skin:     General: Skin is warm and dry.   Neurological:      General: No focal deficit present.      Mental Status: She is alert and oriented to person, place,  and time. Mental status is at baseline.   Psychiatric:         Behavior: Behavior normal.         Results Reviewed       None            No orders to display       Procedures    ED Medication and Procedure Management   Prior to Admission Medications   Prescriptions Last Dose Informant Patient Reported? Taking?   albuterol (2.5 mg/3 mL) 0.083 % nebulizer solution   No No   Sig: Take 3 mL (2.5 mg total) by nebulization every 6 (six) hours as needed for wheezing or shortness of breath   ergocalciferol (VITAMIN D2) 50,000 units   No No   Sig: Take 1 capsule (50,000 Units total) by mouth once a week   hydrOXYzine HCL (ATARAX) 10 mg tablet   No No   Sig: Take 1 tablet (10 mg total) by mouth every 6 (six) hours as needed for anxiety   hydrocortisone (ANUSOL-HC) 2.5 % rectal cream   No No   Sig: Apply topically 2 (two) times a day   hydrocortisone (ANUSOL-HC) 25 mg suppository   No No   Sig: Insert 1 suppository (25 mg total) into the rectum 2 (two) times a day   rosuvastatin (CRESTOR) 5 mg tablet   No No   Sig: Take 1 tablet (5 mg total) by mouth daily   venlafaxine (EFFEXOR-XR) 75 mg 24 hr capsule   No No   Sig: Take 1 capsule (75 mg total) by mouth daily with breakfast      Facility-Administered Medications: None     Discharge Medication List as of 11/21/2024 11:35 AM        START taking these medications    Details   lidocaine-prilocaine (EMLA) cream Apply topically 3 (three) times a day as needed for mild pain, Starting u 11/21/2024, Normal           CONTINUE these medications which have NOT CHANGED    Details   albuterol (2.5 mg/3 mL) 0.083 % nebulizer solution Take 3 mL (2.5 mg total) by nebulization every 6 (six) hours as needed for wheezing or shortness of breath, Starting Mon 11/27/2023, Normal      ergocalciferol (VITAMIN D2) 50,000 units Take 1 capsule (50,000 Units total) by mouth once a week, Starting Mon 1/22/2024, Normal      hydrocortisone (ANUSOL-HC) 2.5 % rectal cream Apply topically 2 (two) times a  day, Starting Fri 11/15/2024, Normal      hydrocortisone (ANUSOL-HC) 25 mg suppository Insert 1 suppository (25 mg total) into the rectum 2 (two) times a day, Starting Fri 11/15/2024, Normal      hydrOXYzine HCL (ATARAX) 10 mg tablet Take 1 tablet (10 mg total) by mouth every 6 (six) hours as needed for anxiety, Starting Wed 1/24/2024, Normal      rosuvastatin (CRESTOR) 5 mg tablet Take 1 tablet (5 mg total) by mouth daily, Starting Wed 1/24/2024, Normal      venlafaxine (EFFEXOR-XR) 75 mg 24 hr capsule Take 1 capsule (75 mg total) by mouth daily with breakfast, Starting Tue 11/5/2024, Normal             ED SEPSIS DOCUMENTATION   Time reflects when diagnosis was documented in both MDM as applicable and the Disposition within this note       Time User Action Codes Description Comment    11/21/2024 11:33 AM Ngoc Cool [K64.4] External hemorrhoid     11/21/2024 11:33 AM Ngoc Cool [K62.89] Rectal pain                  Ngoc Cool MD  11/21/24 3914
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
## 2024-11-22 ENCOUNTER — TELEPHONE (OUTPATIENT)
Age: 44
End: 2024-11-22

## 2024-11-22 NOTE — TELEPHONE ENCOUNTER
I called patient left voice message to return my call to schedule appointment for hemorrhoids with Dr Hdz.

## 2024-11-26 ENCOUNTER — OFFICE VISIT (OUTPATIENT)
Age: 44
End: 2024-11-26

## 2024-11-26 VITALS
OXYGEN SATURATION: 98 % | HEART RATE: 87 BPM | WEIGHT: 223 LBS | SYSTOLIC BLOOD PRESSURE: 134 MMHG | BODY MASS INDEX: 39.51 KG/M2 | DIASTOLIC BLOOD PRESSURE: 87 MMHG | HEIGHT: 63 IN

## 2024-11-26 DIAGNOSIS — K64.5 THROMBOSED EXTERNAL HEMORRHOIDS: Primary | ICD-10-CM

## 2024-11-26 PROCEDURE — 99243 OFF/OP CNSLTJ NEW/EST LOW 30: CPT | Performed by: COLON & RECTAL SURGERY

## 2024-11-26 RX ORDER — OXYCODONE HYDROCHLORIDE 5 MG/1
5 TABLET ORAL EVERY 4 HOURS PRN
Qty: 20 TABLET | Refills: 0 | Status: SHIPPED | OUTPATIENT
Start: 2024-11-26

## 2024-11-26 NOTE — PROGRESS NOTES
Name: Lory Raman      : 1980      MRN: [id number]  Encounter Provider: Renato Hdz MD  Encounter Date: 2024   Encounter department: [facility] [place]  :  Assessment & Plan  Thrombosed external hemorrhoids  Follow-up  office visit in 7 to 10 days if symptoms fail to resolve  Orders:    oxyCODONE (Roxicodone) 5 immediate release tablet; Take 1 tablet (5 mg total) by mouth every 4 (four) hours as needed for moderate pain for up to 20 doses Max Daily Amount: 30 mg  Local anal care reviewed for management of her hemorrhoids,.  Patient advised to use RectiCare as a supplement to medical therapy.  She was advised to take 2 extra strength Tylenol +2 Aleve every 8 hours for pain and then use oxycodone for breakthrough pain.      History of Present Illness     HPI  Lory Raman is a 44 y.o. female who presents for evaluation and treatment of anal pain secondary to thrombosed hemorrhoid.  The patient has had 2 visits to the emergency room referred for evaluation and treatment  History obtained from: patient    Review of Systems   Constitutional:  Negative for chills and fever.   HENT:  Negative for ear pain and sore throat.    Eyes:  Negative for pain and visual disturbance.   Respiratory:  Negative for cough and shortness of breath.    Cardiovascular:  Negative for chest pain and palpitations.   Gastrointestinal:  Negative for abdominal pain and vomiting.   Genitourinary:  Negative for dysuria and hematuria.   Musculoskeletal:  Negative for arthralgias and back pain.   Skin:  Negative for color change and rash.   Neurological:  Negative for seizures and syncope.   Psychiatric/Behavioral:  The patient is nervous/anxious.    All other systems reviewed and are negative.    Past Medical History   Past Medical History:   Diagnosis Date    Mild persistent asthma without complication      Past Surgical History:   Procedure Laterality Date    HYSTERECTOMY Bilateral 2018  "    Family History   Problem Relation Age of Onset    Hypertension Mother     Breast cancer Sister     Breast cancer Maternal Aunt       reports that she has never smoked. She has never used smokeless tobacco. She reports that she does not currently use alcohol. She reports that she does not use drugs.  Current Outpatient Medications on File Prior to Visit   Medication Sig Dispense Refill    albuterol (2.5 mg/3 mL) 0.083 % nebulizer solution Take 3 mL (2.5 mg total) by nebulization every 6 (six) hours as needed for wheezing or shortness of breath 75 mL 0    ergocalciferol (VITAMIN D2) 50,000 units Take 1 capsule (50,000 Units total) by mouth once a week 16 capsule 0    hydrocortisone (ANUSOL-HC) 2.5 % rectal cream Apply topically 2 (two) times a day 28 g 0    hydrocortisone (ANUSOL-HC) 25 mg suppository Insert 1 suppository (25 mg total) into the rectum 2 (two) times a day 12 suppository 0    hydrOXYzine HCL (ATARAX) 10 mg tablet Take 1 tablet (10 mg total) by mouth every 6 (six) hours as needed for anxiety 90 tablet 1    lidocaine-prilocaine (EMLA) cream Apply topically 3 (three) times a day as needed for mild pain 30 g 0    rosuvastatin (CRESTOR) 5 mg tablet Take 1 tablet (5 mg total) by mouth daily 30 tablet 5    venlafaxine (EFFEXOR-XR) 75 mg 24 hr capsule Take 1 capsule (75 mg total) by mouth daily with breakfast 30 capsule 1     No current facility-administered medications on file prior to visit.   No Known Allergies   Medical History Reviewed by provider this encounter:  Tobacco  Allergies  Meds  Problems  Med Hx  Surg Hx  Fam Hx     .     Objective   /87   Pulse 87   Ht 5' 3\" (1.6 m)   Wt 101 kg (223 lb)   SpO2 98%   BMI 39.50 kg/m²      Physical Exam  Section of the anal margin reveals a 2 cm resolving thrombosed external hemorrhoid.  There is no evidence of cellulitis or infection.  There is currently no evidence of abscess.  Palpation of the area causes the patient discomfort.  Previous " modalities for pain relief have not been efficacious.  Patient has present in the office and tearful

## 2024-12-04 ENCOUNTER — OFFICE VISIT (OUTPATIENT)
Age: 44
End: 2024-12-04

## 2024-12-04 VITALS
HEIGHT: 63 IN | SYSTOLIC BLOOD PRESSURE: 129 MMHG | BODY MASS INDEX: 39.51 KG/M2 | OXYGEN SATURATION: 98 % | TEMPERATURE: 78 F | WEIGHT: 223 LBS | DIASTOLIC BLOOD PRESSURE: 78 MMHG

## 2024-12-04 DIAGNOSIS — K64.5 THROMBOSED EXTERNAL HEMORRHOIDS: Primary | ICD-10-CM

## 2024-12-04 PROCEDURE — 99213 OFFICE O/P EST LOW 20 MIN: CPT | Performed by: COLON & RECTAL SURGERY

## 2024-12-04 NOTE — PROGRESS NOTES
"Name: Lory Raman      : 1980      MRN: [id number]  Encounter Provider: Renato Hdz MD  Encounter Date: 2024   Encounter department: . [facility] [place]  :  Assessment & Plan  Thrombosed external hemorrhoids  Patient's hemorrhoidal swelling has decreased 50 to 60% since index visit.  The patient has no pain of significance.  Pain relieved with Tylenol and topical cream.    Patient advised to continue conservative therapy for hemorrhoids.  At this time surgical resection not recommended patient will follow-up as needed           History of Present Illness     HPI  Lory Raman is a 44 y.o. female who presents recheck of her complicated thrombosed hemorrhoids  History obtained from: patient    Review of Systems   Constitutional:  Negative for chills and fever.   HENT:  Negative for ear pain and sore throat.    Eyes:  Negative for pain and visual disturbance.   Respiratory:  Negative for cough and shortness of breath.    Cardiovascular:  Negative for chest pain and palpitations.   Gastrointestinal:  Negative for abdominal pain and vomiting.   Genitourinary:  Negative for dysuria and hematuria.   Musculoskeletal:  Negative for arthralgias and back pain.   Skin:  Negative for color change and rash.   Neurological:  Negative for seizures and syncope.   All other systems reviewed and are negative.    Medical History Reviewed by provider this encounter:     .     Objective   /78   Temp (!) 78 °F (25.6 °C)   Ht 5' 3\" (1.6 m)   Wt 101 kg (223 lb)   SpO2 98%   BMI 39.50 kg/m²      Physical Exam  Vitals and nursing note reviewed.   Constitutional:       Appearance: Normal appearance. She is obese.   Skin:     General: Skin is warm and dry.   Colorectal exam:    Inspection of the anal margin reveals resolving thrombosed external hemorrhoid with residual skin tags      "

## 2025-02-18 DIAGNOSIS — F42.2 MIXED OBSESSIONAL THOUGHTS AND ACTS: ICD-10-CM

## 2025-02-18 RX ORDER — VENLAFAXINE HYDROCHLORIDE 75 MG/1
75 CAPSULE, EXTENDED RELEASE ORAL
Qty: 60 CAPSULE | Refills: 1 | Status: SHIPPED | OUTPATIENT
Start: 2025-02-18

## 2025-06-20 DIAGNOSIS — F32.2 SEVERE DEPRESSION (HCC): ICD-10-CM

## 2025-06-20 DIAGNOSIS — F42.2 MIXED OBSESSIONAL THOUGHTS AND ACTS: ICD-10-CM

## 2025-06-20 DIAGNOSIS — F41.0 PANIC DISORDER: ICD-10-CM

## 2025-06-20 RX ORDER — VENLAFAXINE HYDROCHLORIDE 75 MG/1
75 CAPSULE, EXTENDED RELEASE ORAL
Qty: 90 CAPSULE | Refills: 3 | Status: SHIPPED | OUTPATIENT
Start: 2025-06-20

## 2025-06-20 RX ORDER — HYDROXYZINE HYDROCHLORIDE 10 MG/1
10 TABLET, FILM COATED ORAL EVERY 6 HOURS PRN
Qty: 180 TABLET | Refills: 3 | Status: SHIPPED | OUTPATIENT
Start: 2025-06-20